# Patient Record
Sex: FEMALE | Race: WHITE | NOT HISPANIC OR LATINO
[De-identification: names, ages, dates, MRNs, and addresses within clinical notes are randomized per-mention and may not be internally consistent; named-entity substitution may affect disease eponyms.]

---

## 2018-01-19 ENCOUNTER — TRANSCRIPTION ENCOUNTER (OUTPATIENT)
Age: 83
End: 2018-01-19

## 2018-01-20 ENCOUNTER — EMERGENCY (EMERGENCY)
Facility: HOSPITAL | Age: 83
LOS: 1 days | Discharge: HOME | End: 2018-01-20
Attending: EMERGENCY MEDICINE | Admitting: EMERGENCY MEDICINE
Payer: MEDICARE

## 2018-01-20 VITALS
SYSTOLIC BLOOD PRESSURE: 134 MMHG | RESPIRATION RATE: 18 BRPM | WEIGHT: 149.91 LBS | OXYGEN SATURATION: 95 % | HEIGHT: 56 IN | TEMPERATURE: 98 F | HEART RATE: 71 BPM | DIASTOLIC BLOOD PRESSURE: 61 MMHG

## 2018-01-20 PROCEDURE — 70450 CT HEAD/BRAIN W/O DYE: CPT

## 2018-01-20 PROCEDURE — 72125 CT NECK SPINE W/O DYE: CPT

## 2018-01-20 PROCEDURE — 99284 EMERGENCY DEPT VISIT MOD MDM: CPT | Mod: 25

## 2018-01-20 PROCEDURE — 70450 CT HEAD/BRAIN W/O DYE: CPT | Mod: 26

## 2018-01-20 PROCEDURE — 99284 EMERGENCY DEPT VISIT MOD MDM: CPT

## 2018-01-20 PROCEDURE — 72125 CT NECK SPINE W/O DYE: CPT | Mod: 26

## 2018-01-20 RX ORDER — ACETAMINOPHEN 500 MG
650 TABLET ORAL ONCE
Qty: 0 | Refills: 0 | Status: COMPLETED | OUTPATIENT
Start: 2018-01-20 | End: 2018-01-20

## 2018-01-20 NOTE — ED ADULT TRIAGE NOTE - CHIEF COMPLAINT QUOTE
patient s/p fall going up stairs yesterday causing right hand and LLE wound. Went to Urgent Care yesterday and received wound care. Patient reports chronic LE edema and now with wound on LLE, wound is weeping. Patient reports today feels nauseas, out of it, dizzy, headache. Ecchymosis to left forehead.

## 2018-01-20 NOTE — ED PROVIDER NOTE - PROGRESS NOTE DETAILS
pt was noted to have a possible mass at the base of the tongue and will follow up with pmd and ent. family given a copy of the report

## 2018-01-20 NOTE — ED PROVIDER NOTE - OBJECTIVE STATEMENT
83 year old female with a h/o htn, hld presents with headache and dizziness. pt states that one day ago she fell while walking upstairs and hit her head against the bannister. she went to an urgent care and had her wounds addressed but since last night hse has had trouble concentrating. pt is not on any blood thinners

## 2018-01-20 NOTE — ED PROVIDER NOTE - CARE PLAN
Principal Discharge DX:	Post concussion syndrome  Secondary Diagnosis:	Hand abrasion, right, initial encounter  Secondary Diagnosis:	Skin avulsion

## 2018-01-20 NOTE — ED ADULT NURSE NOTE - OBJECTIVE STATEMENT
States yesterday fell while walking up the stairs, hit her head against the bannister, went to urgent care and dressed her wounds however today started having a headache and some dizziness.  PAtient denies blood thinners, denies LOC.

## 2018-11-12 ENCOUNTER — EMERGENCY (EMERGENCY)
Facility: HOSPITAL | Age: 83
LOS: 1 days | Discharge: HOME | End: 2018-11-12
Attending: EMERGENCY MEDICINE
Payer: MEDICARE

## 2018-11-12 VITALS
RESPIRATION RATE: 16 BRPM | SYSTOLIC BLOOD PRESSURE: 138 MMHG | OXYGEN SATURATION: 99 % | TEMPERATURE: 99 F | HEART RATE: 78 BPM | DIASTOLIC BLOOD PRESSURE: 75 MMHG

## 2018-11-12 VITALS
WEIGHT: 164.91 LBS | SYSTOLIC BLOOD PRESSURE: 136 MMHG | TEMPERATURE: 98 F | HEART RATE: 86 BPM | HEIGHT: 56 IN | OXYGEN SATURATION: 100 % | DIASTOLIC BLOOD PRESSURE: 69 MMHG | RESPIRATION RATE: 20 BRPM

## 2018-11-12 LAB
ALBUMIN SERPL ELPH-MCNC: 3.3 G/DL — SIGNIFICANT CHANGE UP (ref 3.3–5.2)
ALP SERPL-CCNC: 77 U/L — SIGNIFICANT CHANGE UP (ref 40–120)
ALT FLD-CCNC: 6 U/L — SIGNIFICANT CHANGE UP
ANION GAP SERPL CALC-SCNC: 13 MMOL/L — SIGNIFICANT CHANGE UP (ref 5–17)
APTT BLD: 32.5 SEC — SIGNIFICANT CHANGE UP (ref 27.5–36.3)
AST SERPL-CCNC: 16 U/L — SIGNIFICANT CHANGE UP
BASOPHILS # BLD AUTO: 0 K/UL — SIGNIFICANT CHANGE UP (ref 0–0.2)
BASOPHILS NFR BLD AUTO: 0.5 % — SIGNIFICANT CHANGE UP (ref 0–2)
BILIRUB SERPL-MCNC: 0.3 MG/DL — LOW (ref 0.4–2)
BUN SERPL-MCNC: 12 MG/DL — SIGNIFICANT CHANGE UP (ref 8–20)
CALCIUM SERPL-MCNC: 9.1 MG/DL — SIGNIFICANT CHANGE UP (ref 8.6–10.2)
CHLORIDE SERPL-SCNC: 95 MMOL/L — LOW (ref 98–107)
CO2 SERPL-SCNC: 24 MMOL/L — SIGNIFICANT CHANGE UP (ref 22–29)
CREAT SERPL-MCNC: 0.65 MG/DL — SIGNIFICANT CHANGE UP (ref 0.5–1.3)
EOSINOPHIL # BLD AUTO: 0.1 K/UL — SIGNIFICANT CHANGE UP (ref 0–0.5)
EOSINOPHIL NFR BLD AUTO: 1.2 % — SIGNIFICANT CHANGE UP (ref 0–6)
GLUCOSE SERPL-MCNC: 98 MG/DL — SIGNIFICANT CHANGE UP (ref 70–115)
HCT VFR BLD CALC: 32.4 % — LOW (ref 37–47)
HGB BLD-MCNC: 10.6 G/DL — LOW (ref 12–16)
INR BLD: 1.05 RATIO — SIGNIFICANT CHANGE UP (ref 0.88–1.16)
LYMPHOCYTES # BLD AUTO: 1.3 K/UL — SIGNIFICANT CHANGE UP (ref 1–4.8)
LYMPHOCYTES # BLD AUTO: 20.2 % — SIGNIFICANT CHANGE UP (ref 20–55)
MCHC RBC-ENTMCNC: 27.3 PG — SIGNIFICANT CHANGE UP (ref 27–31)
MCHC RBC-ENTMCNC: 32.7 G/DL — SIGNIFICANT CHANGE UP (ref 32–36)
MCV RBC AUTO: 83.5 FL — SIGNIFICANT CHANGE UP (ref 81–99)
MONOCYTES # BLD AUTO: 0.6 K/UL — SIGNIFICANT CHANGE UP (ref 0–0.8)
MONOCYTES NFR BLD AUTO: 9.2 % — SIGNIFICANT CHANGE UP (ref 3–10)
NEUTROPHILS # BLD AUTO: 4.6 K/UL — SIGNIFICANT CHANGE UP (ref 1.8–8)
NEUTROPHILS NFR BLD AUTO: 68.7 % — SIGNIFICANT CHANGE UP (ref 37–73)
NT-PROBNP SERPL-SCNC: 923 PG/ML — HIGH (ref 0–300)
PLATELET # BLD AUTO: 330 K/UL — SIGNIFICANT CHANGE UP (ref 150–400)
POTASSIUM SERPL-MCNC: 4.2 MMOL/L — SIGNIFICANT CHANGE UP (ref 3.5–5.3)
POTASSIUM SERPL-SCNC: 4.2 MMOL/L — SIGNIFICANT CHANGE UP (ref 3.5–5.3)
PROT SERPL-MCNC: 6.5 G/DL — LOW (ref 6.6–8.7)
PROTHROM AB SERPL-ACNC: 12.1 SEC — SIGNIFICANT CHANGE UP (ref 10–12.9)
RBC # BLD: 3.88 M/UL — LOW (ref 4.4–5.2)
RBC # FLD: 14.8 % — SIGNIFICANT CHANGE UP (ref 11–15.6)
SODIUM SERPL-SCNC: 132 MMOL/L — LOW (ref 135–145)
TROPONIN T SERPL-MCNC: <0.01 NG/ML — SIGNIFICANT CHANGE UP (ref 0–0.06)
WBC # BLD: 6.6 K/UL — SIGNIFICANT CHANGE UP (ref 4.8–10.8)
WBC # FLD AUTO: 6.6 K/UL — SIGNIFICANT CHANGE UP (ref 4.8–10.8)

## 2018-11-12 PROCEDURE — 73562 X-RAY EXAM OF KNEE 3: CPT

## 2018-11-12 PROCEDURE — 85027 COMPLETE CBC AUTOMATED: CPT

## 2018-11-12 PROCEDURE — 99285 EMERGENCY DEPT VISIT HI MDM: CPT

## 2018-11-12 PROCEDURE — 71045 X-RAY EXAM CHEST 1 VIEW: CPT | Mod: 26

## 2018-11-12 PROCEDURE — 85730 THROMBOPLASTIN TIME PARTIAL: CPT

## 2018-11-12 PROCEDURE — 76377 3D RENDER W/INTRP POSTPROCES: CPT

## 2018-11-12 PROCEDURE — 99284 EMERGENCY DEPT VISIT MOD MDM: CPT | Mod: 25

## 2018-11-12 PROCEDURE — 36415 COLL VENOUS BLD VENIPUNCTURE: CPT

## 2018-11-12 PROCEDURE — 83880 ASSAY OF NATRIURETIC PEPTIDE: CPT

## 2018-11-12 PROCEDURE — 93970 EXTREMITY STUDY: CPT | Mod: 26

## 2018-11-12 PROCEDURE — 93005 ELECTROCARDIOGRAM TRACING: CPT

## 2018-11-12 PROCEDURE — 71045 X-RAY EXAM CHEST 1 VIEW: CPT

## 2018-11-12 PROCEDURE — 93010 ELECTROCARDIOGRAM REPORT: CPT

## 2018-11-12 PROCEDURE — 76377 3D RENDER W/INTRP POSTPROCES: CPT | Mod: 26

## 2018-11-12 PROCEDURE — 73562 X-RAY EXAM OF KNEE 3: CPT | Mod: 26,RT

## 2018-11-12 PROCEDURE — 72192 CT PELVIS W/O DYE: CPT | Mod: 26

## 2018-11-12 PROCEDURE — 85610 PROTHROMBIN TIME: CPT

## 2018-11-12 PROCEDURE — 84484 ASSAY OF TROPONIN QUANT: CPT

## 2018-11-12 PROCEDURE — 93970 EXTREMITY STUDY: CPT

## 2018-11-12 PROCEDURE — 72192 CT PELVIS W/O DYE: CPT

## 2018-11-12 PROCEDURE — 80053 COMPREHEN METABOLIC PANEL: CPT

## 2018-11-12 RX ORDER — IBUPROFEN 200 MG
400 TABLET ORAL ONCE
Qty: 0 | Refills: 0 | Status: COMPLETED | OUTPATIENT
Start: 2018-11-12 | End: 2018-11-12

## 2018-11-12 RX ADMIN — Medication 400 MILLIGRAM(S): at 17:57

## 2018-11-12 RX ADMIN — Medication 400 MILLIGRAM(S): at 18:55

## 2018-11-12 NOTE — ED PROVIDER NOTE - PHYSICAL EXAMINATION
Const: Awake, alert and oriented. In no acute distress. Well appearing.  HEENT: NC/AT. Moist mucous membranes.  Eyes: No scleral icterus. EOMI.  Neck:. Soft and supple. Full ROM without pain.  Cardiac: Irregular rate and rhythm. +S1/S2. No murmurs. Peripheral pulses 2+ and symmetric. 4+ bilateral LE edema.  Resp: Speaking in full sentences. No evidence of respiratory distress. No wheezes, rales or rhonchi.  Abd: Soft, non-tender, non-distended. Normal bowel sounds in all 4 quadrants. No guarding or rebound.  Back: Spine midline and non-tender. No CVAT.  MSK: Right knee without focal edema, erythema or induration. Painless ROM to 30 degrees. Mild posterior popliteal TTP. Pelvis stable, + mild TTP of the right proximal femur. Patient unable to sit up due to severe pain in the right hip.  Skin: No rashes, abrasions or lacerations.  Neuro: Awake, alert & oriented x 3. Moves all extremities symmetrically.

## 2018-11-12 NOTE — ED PROVIDER NOTE - MEDICAL DECISION MAKING DETAILS
Patient presents complaining of right knee pain/ right hip pain which started yesterday. Unable to sit up of flex hip due to pain, right knee exam unremarkable. I suspect right knee pain coming from hip or pelvis. Will give ibuprofen for pain, CT pelvis to r/o occult fracture, XR knee and check EKG and labs.

## 2018-11-12 NOTE — ED PROVIDER NOTE - NS ED ROS FT
Const: Denies fever, chills  HEENT: Denies blurry vision, sore throat  Neck: Denies neck pain/stiffness  Resp: Denies coughing, SOB  Cardiovascular: + LE edema (chronic ). Denies CP, palpitations  GI: + constipation (chronic). Denies nausea, vomiting, abdominal pain, diarrhea, blood in stool  : Denies urinary frequency/urgency/dysuria, hematuria  MSK: + right knee pain, + right hip pain. Denies back pain  Neuro: Denies HA, dizziness, numbness, weakness  Skin: Denies rashes.

## 2018-11-12 NOTE — ED ADULT NURSE NOTE - NSIMPLEMENTINTERV_GEN_ALL_ED
Implemented All Universal Safety Interventions:  [place] to call system. Call bell, personal items and telephone within reach. Instruct patient to call for assistance. Room bathroom lighting operational. Non-slip footwear when patient is off stretcher. Physically safe environment: no spills, clutter or unnecessary equipment. Stretcher in lowest position, wheels locked, appropriate side rails in place.

## 2018-11-12 NOTE — ED PROVIDER NOTE - OBJECTIVE STATEMENT
Patient with PMH HTN, HLD, glaucoma, colitis presents complaining of right knee pain which started last night, which she states was atraumatic and severe. She did not take any medication for the pain. Pain is worse with movement and she has been unable to get into or out of bed or ambulate. She also notes a dull ache at the groin area. She denies back pain, CP, SOB, nausea, vomiting, abdominal pain, urinary symptoms, diarrhea, or numbness. She is chronically constipated. She does not take any blood thinning medications. She believes her bilateral LE are her baseline normal swelling for her.

## 2018-11-12 NOTE — ED ADULT NURSE NOTE - OBJECTIVE STATEMENT
pt has had right knee pain for 3 weeks but it got worse 9/10 yesterday, and she stated she is unable to function she denied chest pain and sob. she denied any injury to it

## 2018-11-12 NOTE — ED PROVIDER NOTE - PROGRESS NOTE DETAILS
Patient reassessed. She notes improvement of pain at this time. Still pending official read of CT and results of labs. pain controlled ambulating gn nad f/u dr velazquez

## 2019-06-02 ENCOUNTER — INPATIENT (INPATIENT)
Facility: HOSPITAL | Age: 84
LOS: 1 days | Discharge: HOME | DRG: 916 | End: 2019-06-04
Attending: INTERNAL MEDICINE | Admitting: INTERNAL MEDICINE
Payer: MEDICARE

## 2019-06-02 VITALS
HEART RATE: 110 BPM | OXYGEN SATURATION: 100 % | DIASTOLIC BLOOD PRESSURE: 75 MMHG | SYSTOLIC BLOOD PRESSURE: 124 MMHG | RESPIRATION RATE: 20 BRPM | TEMPERATURE: 98 F | HEIGHT: 60 IN | WEIGHT: 160.06 LBS

## 2019-06-02 DIAGNOSIS — T78.3XXA ANGIONEUROTIC EDEMA, INITIAL ENCOUNTER: ICD-10-CM

## 2019-06-02 DIAGNOSIS — J98.8 OTHER SPECIFIED RESPIRATORY DISORDERS: ICD-10-CM

## 2019-06-02 LAB
ALBUMIN SERPL ELPH-MCNC: 3.7 G/DL — SIGNIFICANT CHANGE UP (ref 3.3–5.2)
ALP SERPL-CCNC: 110 U/L — SIGNIFICANT CHANGE UP (ref 40–120)
ALT FLD-CCNC: 10 U/L — SIGNIFICANT CHANGE UP
ANION GAP SERPL CALC-SCNC: 13 MMOL/L — SIGNIFICANT CHANGE UP (ref 5–17)
AST SERPL-CCNC: 20 U/L — SIGNIFICANT CHANGE UP
BILIRUB SERPL-MCNC: 0.3 MG/DL — LOW (ref 0.4–2)
BUN SERPL-MCNC: 19 MG/DL — SIGNIFICANT CHANGE UP (ref 8–20)
CALCIUM SERPL-MCNC: 9.6 MG/DL — SIGNIFICANT CHANGE UP (ref 8.6–10.2)
CHLORIDE SERPL-SCNC: 101 MMOL/L — SIGNIFICANT CHANGE UP (ref 98–107)
CO2 SERPL-SCNC: 26 MMOL/L — SIGNIFICANT CHANGE UP (ref 22–29)
CREAT SERPL-MCNC: 0.97 MG/DL — SIGNIFICANT CHANGE UP (ref 0.5–1.3)
ERYTHROCYTE [SEDIMENTATION RATE] IN BLOOD: 52 MM/HR — HIGH (ref 0–20)
GLUCOSE SERPL-MCNC: 95 MG/DL — SIGNIFICANT CHANGE UP (ref 70–115)
HCT VFR BLD CALC: 33.4 % — LOW (ref 37–47)
HGB BLD-MCNC: 10.9 G/DL — LOW (ref 12–16)
LIDOCAIN IGE QN: 54 U/L — HIGH (ref 22–51)
MCHC RBC-ENTMCNC: 27.3 PG — SIGNIFICANT CHANGE UP (ref 27–31)
MCHC RBC-ENTMCNC: 32.6 G/DL — SIGNIFICANT CHANGE UP (ref 32–36)
MCV RBC AUTO: 83.7 FL — SIGNIFICANT CHANGE UP (ref 81–99)
PLATELET # BLD AUTO: 364 K/UL — SIGNIFICANT CHANGE UP (ref 150–400)
POTASSIUM SERPL-MCNC: 4.1 MMOL/L — SIGNIFICANT CHANGE UP (ref 3.5–5.3)
POTASSIUM SERPL-SCNC: 4.1 MMOL/L — SIGNIFICANT CHANGE UP (ref 3.5–5.3)
PROT SERPL-MCNC: 7.7 G/DL — SIGNIFICANT CHANGE UP (ref 6.6–8.7)
RBC # BLD: 3.99 M/UL — LOW (ref 4.4–5.2)
RBC # FLD: 14.8 % — SIGNIFICANT CHANGE UP (ref 11–15.6)
SODIUM SERPL-SCNC: 140 MMOL/L — SIGNIFICANT CHANGE UP (ref 135–145)
TROPONIN T SERPL-MCNC: <0.01 NG/ML — SIGNIFICANT CHANGE UP (ref 0–0.06)
WBC # BLD: 7.1 K/UL — SIGNIFICANT CHANGE UP (ref 4.8–10.8)
WBC # FLD AUTO: 7.1 K/UL — SIGNIFICANT CHANGE UP (ref 4.8–10.8)

## 2019-06-02 PROCEDURE — 71045 X-RAY EXAM CHEST 1 VIEW: CPT | Mod: 26

## 2019-06-02 PROCEDURE — 99291 CRITICAL CARE FIRST HOUR: CPT

## 2019-06-02 PROCEDURE — 93010 ELECTROCARDIOGRAM REPORT: CPT

## 2019-06-02 RX ORDER — DEXAMETHASONE 0.5 MG/5ML
6 ELIXIR ORAL EVERY 6 HOURS
Refills: 0 | Status: DISCONTINUED | OUTPATIENT
Start: 2019-06-02 | End: 2019-06-04

## 2019-06-02 RX ORDER — FUROSEMIDE 40 MG
1 TABLET ORAL
Qty: 0 | Refills: 0 | DISCHARGE

## 2019-06-02 RX ORDER — DIPHENHYDRAMINE HCL 50 MG
25 CAPSULE ORAL ONCE
Refills: 0 | Status: COMPLETED | OUTPATIENT
Start: 2019-06-02 | End: 2019-06-02

## 2019-06-02 RX ORDER — ENOXAPARIN SODIUM 100 MG/ML
40 INJECTION SUBCUTANEOUS DAILY
Refills: 0 | Status: DISCONTINUED | OUTPATIENT
Start: 2019-06-02 | End: 2019-06-04

## 2019-06-02 RX ORDER — FAMOTIDINE 10 MG/ML
20 INJECTION INTRAVENOUS ONCE
Refills: 0 | Status: COMPLETED | OUTPATIENT
Start: 2019-06-02 | End: 2019-06-02

## 2019-06-02 RX ORDER — DEXAMETHASONE 0.5 MG/5ML
10 ELIXIR ORAL ONCE
Refills: 0 | Status: COMPLETED | OUTPATIENT
Start: 2019-06-02 | End: 2019-06-02

## 2019-06-02 RX ORDER — DIPHENHYDRAMINE HCL 50 MG
50 CAPSULE ORAL EVERY 8 HOURS
Refills: 0 | Status: DISCONTINUED | OUTPATIENT
Start: 2019-06-02 | End: 2019-06-04

## 2019-06-02 RX ORDER — LATANOPROST 0.05 MG/ML
1 SOLUTION/ DROPS OPHTHALMIC; TOPICAL AT BEDTIME
Refills: 0 | Status: DISCONTINUED | OUTPATIENT
Start: 2019-06-02 | End: 2019-06-04

## 2019-06-02 RX ORDER — TIMOLOL 0.5 %
1 DROPS OPHTHALMIC (EYE) DAILY
Refills: 0 | Status: DISCONTINUED | OUTPATIENT
Start: 2019-06-02 | End: 2019-06-04

## 2019-06-02 RX ORDER — CHLORHEXIDINE GLUCONATE 213 G/1000ML
1 SOLUTION TOPICAL DAILY
Refills: 0 | Status: DISCONTINUED | OUTPATIENT
Start: 2019-06-02 | End: 2019-06-04

## 2019-06-02 RX ORDER — FAMOTIDINE 10 MG/ML
20 INJECTION INTRAVENOUS EVERY 12 HOURS
Refills: 0 | Status: DISCONTINUED | OUTPATIENT
Start: 2019-06-02 | End: 2019-06-04

## 2019-06-02 RX ADMIN — ENOXAPARIN SODIUM 40 MILLIGRAM(S): 100 INJECTION SUBCUTANEOUS at 17:45

## 2019-06-02 RX ADMIN — FAMOTIDINE 20 MILLIGRAM(S): 10 INJECTION INTRAVENOUS at 17:45

## 2019-06-02 RX ADMIN — Medication 101 MILLIGRAM(S): at 04:15

## 2019-06-02 RX ADMIN — CHLORHEXIDINE GLUCONATE 1 APPLICATION(S): 213 SOLUTION TOPICAL at 17:21

## 2019-06-02 RX ADMIN — Medication 6 MILLIGRAM(S): at 17:44

## 2019-06-02 RX ADMIN — Medication 6 MILLIGRAM(S): at 11:28

## 2019-06-02 RX ADMIN — LATANOPROST 1 DROP(S): 0.05 SOLUTION/ DROPS OPHTHALMIC; TOPICAL at 21:32

## 2019-06-02 RX ADMIN — Medication 6 MILLIGRAM(S): at 23:35

## 2019-06-02 RX ADMIN — Medication 50 MILLIGRAM(S): at 14:19

## 2019-06-02 RX ADMIN — Medication 10 MILLIGRAM(S): at 04:00

## 2019-06-02 RX ADMIN — Medication 50 MILLIGRAM(S): at 21:32

## 2019-06-02 RX ADMIN — FAMOTIDINE 20 MILLIGRAM(S): 10 INJECTION INTRAVENOUS at 04:00

## 2019-06-02 NOTE — H&P ADULT - ASSESSMENT
Pt is an 84 YOF with angioedema and airway/tongue edema admitted to ICU for concern of compromised airway and potential to require intubation.

## 2019-06-02 NOTE — ED ADULT NURSE NOTE - CHIEF COMPLAINT QUOTE
Pt A&Ox4 states "My tongue is swollen and its getting hard to swallow."  BIBA from home c/o feeling SOB and difficulty swallowing with tongue swelling arms began to get red and itchy in ambulance. Patient awake alert and cooperative, tongue swollen with difficulty swallowing, muffled speech, MAEx4, does not know what could be having allergic reaction to. MD ROA at bedside code team called.

## 2019-06-02 NOTE — ED ADULT NURSE NOTE - OBJECTIVE STATEMENT
Pt presented to Ed stating she went to bed today feeling itchy. Woke and felt worse, with tongue swelling and called 911. Code team called to bedside. Dr Singh to bedside. Iv placed labs sent. SP02 98% on room air. Lungs clear bilaterally, respirations even non labored. speech muffled, but able to speak. Pt denies any new medications or allergies to food. Pt presented to Ed stating she went to bed today feeling itchy. Woke and felt worse, with tongue swelling and called 911. Code team called to bedside. Dr Singh to bedside. Iv placed labs sent. SP02 98% on room air. Lungs clear bilaterally, respirations even non labored. No s/s of respiratory distress noted. speech muffled, but able to speak. Pt denies any new medications or allergies to food. Iv placed labs sent medications given per order.

## 2019-06-02 NOTE — ED PROVIDER NOTE - PROGRESS NOTE DETAILS
Called ICU for consultation, however team is upstairs intubating another patient, awaiting call back. Patient with continued tongue swelling ( unchanged) but tolerating oral secretions and speaking clearly. ICU consultation obtained. Will admit. Patient resting comfortably.

## 2019-06-02 NOTE — H&P ADULT - ATTENDING COMMENTS
Pt admitted by [facility], I have seen and evaluated this patient independently and agree with the above findings except as follows:  84 YOF with angioedema and airway/tongue edema admitted to ICU for concern of compromised airway possibly allergic reaction from bumex vs angioedema from ACEi? Pt has a patent airway and tongue edema has regressed somewhat, she continues to have a large fluid filled bullae on right lateral tongue surface and c/o difficulty swallowing, air is passing freely through with no auscultated extraneous noise on neck, there is some submandibular fullness will continue to monitor airway, continue decadron, H2 blockers.

## 2019-06-02 NOTE — H&P ADULT - ENMT COMMENTS
tongue edema, difficulty to visualize posterior pharynx, managing secretions at this time, no drooling

## 2019-06-02 NOTE — ED ADULT NURSE NOTE - CHPI ED NUR SYMPTOMS NEG
no congestion/no nausea/no wheezing/no difficulty breathing/no rash/no vomiting/no shortness of breath

## 2019-06-02 NOTE — PATIENT PROFILE ADULT - NSPROEXTENSIONSOFSELF_GEN_A_NUR
Pt given discharge papers and work note. Pt did not have any questions and ambulated out of dept.    eyeglasses

## 2019-06-02 NOTE — ED ADULT NURSE REASSESSMENT NOTE - NS ED NURSE REASSESS COMMENT FT1
Pt resting comfortably in bed. Respirations even non labored. No s/s of respiratory distress noted. SP02 97% on Room air. Pt denies difficulty breathing, stated she feels slightly better. Pt admitted to MICU. AWaiting report to RN in MICU. safety maintained.

## 2019-06-02 NOTE — H&P ADULT - NSHPSOCIALHISTORY_GEN_ALL_CORE
Lives alone but has daughter who helps her with shopping, etc.  Never smoked, no ETOH, no drug use,  uses walker to walk but has some difficulty

## 2019-06-02 NOTE — ED PROVIDER NOTE - CARDIAC, MLM
Tachycardic rate, regular rhythm.  Heart sounds S1, S2.  No murmurs, rubs or gallops. Swollen LE (but has chronic lymphedema, unchanged)

## 2019-06-02 NOTE — SWALLOW BEDSIDE ASSESSMENT ADULT - SWALLOW EVAL: RECOMMENDED FEEDING/EATING TECHNIQUES
maintain upright posture during/after eating for 30 mins/oral hygiene/allow for swallow between intakes/small sips/bites

## 2019-06-02 NOTE — ED ADULT NURSE NOTE - NSIMPLEMENTINTERV_GEN_ALL_ED
Implemented All Universal Safety Interventions:  [place] to call system. Call bell, personal items and telephone within reach. Instruct patient to call for assistance. Room bathroom lighting operational. Non-slip footwear when patient is off stretcher. Physically safe environment: no spills, clutter or unnecessary equipment. Stretcher in lowest position, wheels locked, appropriate side rails in place.

## 2019-06-02 NOTE — H&P ADULT - PROBLEM SELECTOR PLAN 1
Decadron IV  Benadryl  Pepcid  Unclear if angioedema from ACE-I vs new medication allergy to Bumex  If airway edema worsens may need intubation/airway protection  Hold both bumex and ACE_I  May require different antihypertensives

## 2019-06-02 NOTE — ED PROVIDER NOTE - OBJECTIVE STATEMENT
83 y/o F with PMHx of colitis, glaucoma, HTN and HLD presents to ED c/o sudden onset tongue swelling, difficulty breathing and difficulty swallowing tonight. States throughout the day, she has generalized itchiness diffusely across her body, but was able to fall asleep until she woke up suddenly with these symptoms and activated EMS. Denies fevers, chills, cough, congestion, chest pain, abdominal pain, n/v/d, ingestion of new foods, new environmental exposures or known bug bites. No further acute complaints at this time.

## 2019-06-02 NOTE — H&P ADULT - HISTORY OF PRESENT ILLNESS
Pt is an 84 YOF h/o HTN, HLD, Colitis in distant past, Glaucoma, presented  to ED last night overnight with  c/o sudden onset tongue swelling, difficulty breathing and difficulty swallowing tonight. States throughout the day, she has generalized itchiness diffusely across her body, but was able to fall asleep until she woke up suddenly with these symptoms and activated EMS. Denies fevers, chills, cough, congestion, chest pain, abdominal pain, n/v/d, ingestion of new foods, new environmental exposures or known bug bites.  Pt reports she was recently started on bumex for lymphedema and has been on enalapril for years.  Denies other complaints.  PT was admitted to ICU due to concern for airway protection and is receiving steroids, benadryl and pepcid.

## 2019-06-03 LAB
ANION GAP SERPL CALC-SCNC: 11 MMOL/L — SIGNIFICANT CHANGE UP (ref 5–17)
BUN SERPL-MCNC: 25 MG/DL — HIGH (ref 8–20)
C3 SERPL-MCNC: 118 MG/DL — SIGNIFICANT CHANGE UP (ref 81–157)
C4 SERPL-MCNC: 23 MG/DL — SIGNIFICANT CHANGE UP (ref 13–39)
CALCIUM SERPL-MCNC: 8.7 MG/DL — SIGNIFICANT CHANGE UP (ref 8.6–10.2)
CHLORIDE SERPL-SCNC: 105 MMOL/L — SIGNIFICANT CHANGE UP (ref 98–107)
CO2 SERPL-SCNC: 23 MMOL/L — SIGNIFICANT CHANGE UP (ref 22–29)
CREAT SERPL-MCNC: 0.9 MG/DL — SIGNIFICANT CHANGE UP (ref 0.5–1.3)
GLUCOSE SERPL-MCNC: 126 MG/DL — HIGH (ref 70–115)
HCT VFR BLD CALC: 29.5 % — LOW (ref 37–47)
HGB BLD-MCNC: 9.6 G/DL — LOW (ref 12–16)
MAGNESIUM SERPL-MCNC: 2.1 MG/DL — SIGNIFICANT CHANGE UP (ref 1.6–2.6)
MCHC RBC-ENTMCNC: 27.1 PG — SIGNIFICANT CHANGE UP (ref 27–31)
MCHC RBC-ENTMCNC: 32.5 G/DL — SIGNIFICANT CHANGE UP (ref 32–36)
MCV RBC AUTO: 83.3 FL — SIGNIFICANT CHANGE UP (ref 81–99)
PHOSPHATE SERPL-MCNC: 3.8 MG/DL — SIGNIFICANT CHANGE UP (ref 2.4–4.7)
PLATELET # BLD AUTO: 310 K/UL — SIGNIFICANT CHANGE UP (ref 150–400)
POTASSIUM SERPL-MCNC: 4.4 MMOL/L — SIGNIFICANT CHANGE UP (ref 3.5–5.3)
POTASSIUM SERPL-SCNC: 4.4 MMOL/L — SIGNIFICANT CHANGE UP (ref 3.5–5.3)
RBC # BLD: 3.54 M/UL — LOW (ref 4.4–5.2)
RBC # FLD: 14.8 % — SIGNIFICANT CHANGE UP (ref 11–15.6)
SODIUM SERPL-SCNC: 139 MMOL/L — SIGNIFICANT CHANGE UP (ref 135–145)
WBC # BLD: 4 K/UL — LOW (ref 4.8–10.8)
WBC # FLD AUTO: 4 K/UL — LOW (ref 4.8–10.8)

## 2019-06-03 PROCEDURE — 99223 1ST HOSP IP/OBS HIGH 75: CPT

## 2019-06-03 PROCEDURE — 12345: CPT | Mod: NC

## 2019-06-03 RX ORDER — BUMETANIDE 0.25 MG/ML
2 INJECTION INTRAMUSCULAR; INTRAVENOUS DAILY
Refills: 0 | Status: DISCONTINUED | OUTPATIENT
Start: 2019-06-03 | End: 2019-06-04

## 2019-06-03 RX ADMIN — Medication 6 MILLIGRAM(S): at 06:14

## 2019-06-03 RX ADMIN — Medication 6 MILLIGRAM(S): at 18:45

## 2019-06-03 RX ADMIN — Medication 50 MILLIGRAM(S): at 13:33

## 2019-06-03 RX ADMIN — Medication 6 MILLIGRAM(S): at 13:33

## 2019-06-03 RX ADMIN — Medication 50 MILLIGRAM(S): at 06:14

## 2019-06-03 RX ADMIN — LATANOPROST 1 DROP(S): 0.05 SOLUTION/ DROPS OPHTHALMIC; TOPICAL at 22:25

## 2019-06-03 RX ADMIN — Medication 1 DROP(S): at 06:14

## 2019-06-03 RX ADMIN — CHLORHEXIDINE GLUCONATE 1 APPLICATION(S): 213 SOLUTION TOPICAL at 13:29

## 2019-06-03 RX ADMIN — FAMOTIDINE 20 MILLIGRAM(S): 10 INJECTION INTRAVENOUS at 06:15

## 2019-06-03 NOTE — CHART NOTE - NSCHARTNOTEFT_GEN_A_CORE
HPI/HOSPITAL COURSE: Pt is an 83 YO Female with a  pmh/o HTN on ACE, HLD, chronic lymphedema for which she has ZOILA boots and was recently started on bumex, Colitis, Glaucoma, who originally  presented  to ED and was admitted to ICU due to angioedema likely from ACEI vs bumex allergy. Pt experienced sudden onset tongue swelling, difficulty breathing and difficulty swallowing and required IV steroids/H2 blocker/benadryl. Denies fevers, chills, cough, congestion, chest pain, abdominal pain, n/v/d, ingestion of new foods, new environmental exposures or known bug bites.  No new complaints, states her urticaria has improved as has her breathing and swelling. Pt downgraded with plan for d/c off ACE-I.     ROS: neg except as per HPI    Allergies:  Benzalkonium Chloride (Unknown)  lactose (Other)  Intolerances    PAST MEDICAL & SURGICAL HISTORY:  Colitis  Hypertension  Hyperlipemia  Glaucoma  Cataract of left eye    FAMILY HISTORY: non contributory, no first degree history of ACE allergy    Home Medications:  bumetanide 2 mg oral tablet: 1 tab(s) orally once a day (02 Jun 2019 21:08)  enalapril 10 mg oral tablet: 2 tab(s) orally once a day (02 Jun 2019 21:08)  Timoptic 0.5% ophthalmic solution: 1 dose(s) to right eye (02 Jun 2019 21:08)  Travatan Z 0.004% ophthalmic solution: 1 drop(s) to each affected eye once a day (in the evening) (02 Jun 2019 21:08)    MEDICATIONS  (STANDING):  chlorhexidine 2% Cloths 1 Application(s) Topical daily  dexamethasone  Injectable 6 milliGRAM(s) IV Push every 6 hours  diphenhydrAMINE   Injectable 50 milliGRAM(s) IV Push every 8 hours  enoxaparin Injectable 40 milliGRAM(s) SubCutaneous daily  famotidine Injectable 20 milliGRAM(s) IV Push every 12 hours  latanoprost 0.005% Ophthalmic Solution 1 Drop(s) Right EYE at bedtime  timolol 0.5% Solution 1 Drop(s) Right EYE daily    SOCIAL HISTORY: no drug/etoh/smoking, uses walker, lives alone    VITAL SIGNS:  T(C): 36.7 (02 Jun 2019 23:00), Max: 36.9 (02 Jun 2019 20:59)  T(F): 98 (02 Jun 2019 23:00), Max: 98.4 (02 Jun 2019 20:59)  HR: 75 (02 Jun 2019 23:00) (70 - 116)  BP: 120/62 (02 Jun 2019 23:00) (118/56 - 149/86)  BP(mean): 79 (02 Jun 2019 21:00) (79 - 110)  RR: 18 (02 Jun 2019 23:00) (13 - 29)  SpO2: 97% (02 Jun 2019 23:00) (92% - 100%)    PHYSICAL EXAM:  Constitutional: NAD  Eyes: EOMI  Respiratory: CTABL  Cardiovascular: RRR, NSR  Gastrointestinal: NTND, +BS  Extremities: b/l zoila boots in place, warm, palpable pulses  Neurological: AAOx3, no focal neurological deficits appreciated on exam  Musculoskeletal: +5MSK throughout  Psychiatric: AAOx3    Assessment and Plan: Pt is an 85 yo female admitted to ICU likely secondary to ACE-I allergy    1) Angioedema- acute, concerning for ACE-I allergy   cont benadryl, decadron, pepcid  passed swallow eval-advance diet as tolerated  hold ACEI and bumex-give antiHTN if needed    2) Chronic lymphedema  hold bumex  monitor renal function and fluid status-may need diuresis  f/u with vascular as outpt for ZOILA boots    3) Gait instability  consider PT eval prior to d/c  ambulates with walker at home    4) Need for prophylactic measure

## 2019-06-04 ENCOUNTER — TRANSCRIPTION ENCOUNTER (OUTPATIENT)
Age: 84
End: 2019-06-04

## 2019-06-04 VITALS
RESPIRATION RATE: 18 BRPM | TEMPERATURE: 98 F | OXYGEN SATURATION: 96 % | DIASTOLIC BLOOD PRESSURE: 75 MMHG | SYSTOLIC BLOOD PRESSURE: 125 MMHG | HEART RATE: 62 BPM

## 2019-06-04 LAB
HCT VFR BLD CALC: 30.7 % — LOW (ref 37–47)
HGB BLD-MCNC: 9.9 G/DL — LOW (ref 12–16)
MCHC RBC-ENTMCNC: 27.1 PG — SIGNIFICANT CHANGE UP (ref 27–31)
MCHC RBC-ENTMCNC: 32.2 G/DL — SIGNIFICANT CHANGE UP (ref 32–36)
MCV RBC AUTO: 84.1 FL — SIGNIFICANT CHANGE UP (ref 81–99)
PLATELET # BLD AUTO: 318 K/UL — SIGNIFICANT CHANGE UP (ref 150–400)
RBC # BLD: 3.65 M/UL — LOW (ref 4.4–5.2)
RBC # FLD: 15.1 % — SIGNIFICANT CHANGE UP (ref 11–15.6)
WBC # BLD: 7.5 K/UL — SIGNIFICANT CHANGE UP (ref 4.8–10.8)
WBC # FLD AUTO: 7.5 K/UL — SIGNIFICANT CHANGE UP (ref 4.8–10.8)

## 2019-06-04 PROCEDURE — 86160 COMPLEMENT ANTIGEN: CPT

## 2019-06-04 PROCEDURE — 83690 ASSAY OF LIPASE: CPT

## 2019-06-04 PROCEDURE — 85652 RBC SED RATE AUTOMATED: CPT

## 2019-06-04 PROCEDURE — 97163 PT EVAL HIGH COMPLEX 45 MIN: CPT

## 2019-06-04 PROCEDURE — 80053 COMPREHEN METABOLIC PANEL: CPT

## 2019-06-04 PROCEDURE — 80048 BASIC METABOLIC PNL TOTAL CA: CPT

## 2019-06-04 PROCEDURE — 85027 COMPLETE CBC AUTOMATED: CPT

## 2019-06-04 PROCEDURE — 86161 COMPLEMENT/FUNCTION ACTIVITY: CPT

## 2019-06-04 PROCEDURE — 84484 ASSAY OF TROPONIN QUANT: CPT

## 2019-06-04 PROCEDURE — 83735 ASSAY OF MAGNESIUM: CPT

## 2019-06-04 PROCEDURE — 96375 TX/PRO/DX INJ NEW DRUG ADDON: CPT

## 2019-06-04 PROCEDURE — 99285 EMERGENCY DEPT VISIT HI MDM: CPT | Mod: 25

## 2019-06-04 PROCEDURE — 84100 ASSAY OF PHOSPHORUS: CPT

## 2019-06-04 PROCEDURE — 36415 COLL VENOUS BLD VENIPUNCTURE: CPT

## 2019-06-04 PROCEDURE — 71045 X-RAY EXAM CHEST 1 VIEW: CPT

## 2019-06-04 PROCEDURE — 99239 HOSP IP/OBS DSCHRG MGMT >30: CPT

## 2019-06-04 PROCEDURE — 96374 THER/PROPH/DIAG INJ IV PUSH: CPT

## 2019-06-04 PROCEDURE — 93005 ELECTROCARDIOGRAM TRACING: CPT

## 2019-06-04 RX ORDER — EPINEPHRINE 0.3 MG/.3ML
0.3 INJECTION INTRAMUSCULAR; SUBCUTANEOUS
Qty: 0.6 | Refills: 0
Start: 2019-06-04 | End: 2019-07-03

## 2019-06-04 RX ORDER — BUMETANIDE 0.25 MG/ML
1 INJECTION INTRAMUSCULAR; INTRAVENOUS
Qty: 0 | Refills: 0 | DISCHARGE
Start: 2019-06-04

## 2019-06-04 RX ORDER — BUMETANIDE 0.25 MG/ML
1 INJECTION INTRAMUSCULAR; INTRAVENOUS
Qty: 0 | Refills: 0 | DISCHARGE

## 2019-06-04 RX ADMIN — Medication 1 DROP(S): at 06:49

## 2019-06-04 RX ADMIN — FAMOTIDINE 20 MILLIGRAM(S): 10 INJECTION INTRAVENOUS at 17:55

## 2019-06-04 RX ADMIN — FAMOTIDINE 20 MILLIGRAM(S): 10 INJECTION INTRAVENOUS at 00:02

## 2019-06-04 RX ADMIN — Medication 50 MILLIGRAM(S): at 13:59

## 2019-06-04 RX ADMIN — Medication 50 MILLIGRAM(S): at 00:02

## 2019-06-04 RX ADMIN — Medication 6 MILLIGRAM(S): at 00:02

## 2019-06-04 RX ADMIN — Medication 50 MILLIGRAM(S): at 06:49

## 2019-06-04 RX ADMIN — Medication 6 MILLIGRAM(S): at 11:24

## 2019-06-04 RX ADMIN — ENOXAPARIN SODIUM 40 MILLIGRAM(S): 100 INJECTION SUBCUTANEOUS at 00:03

## 2019-06-04 RX ADMIN — FAMOTIDINE 20 MILLIGRAM(S): 10 INJECTION INTRAVENOUS at 06:49

## 2019-06-04 RX ADMIN — Medication 6 MILLIGRAM(S): at 17:55

## 2019-06-04 RX ADMIN — Medication 6 MILLIGRAM(S): at 06:49

## 2019-06-04 NOTE — PHYSICAL THERAPY INITIAL EVALUATION ADULT - ADDITIONAL COMMENTS
per patient she does not have steps to enter. She ambulates with a RW and has PT and an aide come over.

## 2019-06-04 NOTE — DISCHARGE NOTE PROVIDER - CARE PROVIDER_API CALL
Mahendra Sanchez)  Internal Medicine  [address] [place] [address]  Phone: [phone]  Fax: [phone]  Follow Up Time: 1 week

## 2019-06-04 NOTE — PHYSICAL THERAPY INITIAL EVALUATION ADULT - PLANNED THERAPY INTERVENTIONS, PT EVAL
strengthening/neuromuscular re-education/stretching/balance training/ROM/bed mobility training/transfer training

## 2019-06-04 NOTE — DISCHARGE NOTE PROVIDER - NSDCCPCAREPLAN_GEN_ALL_CORE_FT
PRINCIPAL DISCHARGE DIAGNOSIS  Diagnosis: Angioedema, initial encounter  Assessment and Plan of Treatment: You were treated for this condition and at this time it is considered resolved. You may follow up with your doctors as an outpatient. DO NOT TAKE ANY MEDICATION IN THE ACE INHIBITOR FAMILY AT ALL.      SECONDARY DISCHARGE DIAGNOSES  Diagnosis: Chronic acquired lymphedema  Assessment and Plan of Treatment: follow up as recommended with a vascular surgeon to care for your legs

## 2019-06-04 NOTE — DISCHARGE NOTE PROVIDER - NSDCFUADDAPPT_GEN_ALL_CORE_FT
-Follow up with Primary Care Doctor within 1 week. Be sure to bring a copy of your entire discharge documentation with you to your visit so that they can review what occurred during your hospitalization and make a copy for their records.

## 2019-06-04 NOTE — PHYSICAL THERAPY INITIAL EVALUATION ADULT - PERTINENT HX OF CURRENT PROBLEM, REHAB EVAL
Pt presents to [facility] with reports of worsening tongue swelling. pt intubated to preserve airway.

## 2019-06-04 NOTE — DISCHARGE NOTE NURSING/CASE MANAGEMENT/SOCIAL WORK - NSDCDPATPORTLINK_GEN_ALL_CORE
You can access the [facility] Patient Portal, offered by [facility], by registering with the following website: [email or web address]

## 2019-06-05 LAB
C1INH FUNCTIONAL FLD-MCNC: >90 — SIGNIFICANT CHANGE UP
C1INH FUNCTIONAL/C1INH TOTAL MFR SERPL: 29 MG/DL — SIGNIFICANT CHANGE UP (ref 21–39)

## 2019-06-08 ENCOUNTER — EMERGENCY (EMERGENCY)
Facility: HOSPITAL | Age: 84
LOS: 1 days | Discharge: HOME | End: 2019-06-08
Attending: EMERGENCY MEDICINE
Payer: MEDICARE

## 2019-06-08 VITALS
TEMPERATURE: 98 F | RESPIRATION RATE: 16 BRPM | HEIGHT: 56 IN | DIASTOLIC BLOOD PRESSURE: 68 MMHG | WEIGHT: 147.05 LBS | SYSTOLIC BLOOD PRESSURE: 124 MMHG | OXYGEN SATURATION: 96 % | HEART RATE: 107 BPM

## 2019-06-08 PROCEDURE — 99283 EMERGENCY DEPT VISIT LOW MDM: CPT

## 2019-06-08 RX ORDER — DIPHENHYDRAMINE HCL 50 MG
25 CAPSULE ORAL ONCE
Refills: 0 | Status: COMPLETED | OUTPATIENT
Start: 2019-06-08 | End: 2019-06-08

## 2019-06-08 RX ADMIN — Medication 40 MILLIGRAM(S): at 12:23

## 2019-06-08 RX ADMIN — Medication 25 MILLIGRAM(S): at 12:23

## 2019-06-08 NOTE — ED STATDOCS - ATTENDING CONTRIBUTION TO CARE
I, Cyndee Mao, performed the initial face to face bedside interview with this patient regarding history of present illness, review of symptoms and relevant past medical, social and family history.  I completed an independent physical examination.  I was the initial provider who evaluated this patient. I have signed out the follow up of any pending tests (i.e. labs, radiological studies) to the ACP.  I have communicated the patient’s plan of care and disposition with the ACP.

## 2019-06-08 NOTE — ED STATDOCS - OBJECTIVE STATEMENT
83 y/o F w/ PMHx of edema, HTN, HLD and glaucoma presents to ED c/o hives first noticed this morning around 0730. Associated itchiness. Pt states she was seen in [facility] last week for same however she also had tongue swelling within 10-12 hours of noticing the hives. She denies intubation during her previous visit. Pt reports the adverse rxn was caused by ACE inhibitors in combination to her new Rx water pill, she has since d/c water pill. Pt reports her tongue feels heavy or "funny" but denies swelling. Denies f/c/n/v/cp/sob/palpitations/ cough/headache/dizziness/abd.pain/d/c/dysuria/hematuria/recent travel

## 2019-06-08 NOTE — ED ADULT TRIAGE NOTE - CHIEF COMPLAINT QUOTE
Pt was here on Wednesday for allergic reaction to ace inhibitor. Pt states that she now woke up this morning with hives to arms, legs, and abdomen. Nothing on her face, denies any SOB or tongue swelling.

## 2019-06-08 NOTE — ED STATDOCS - CLINICAL SUMMARY MEDICAL DECISION MAKING FREE TEXT BOX
Allergic rxn, no airway involvement will give benadryl and steroids given recent hospitalization for similar. will observe, if getting better and no airway involvement will D/C home. Allergic rxn, no airway involvement, will give benadryl and steroids given recent hospitalization for similar. will observe, if getting better and no airway involvement will D/C home.

## 2019-06-25 LAB — COMPLEMENT FACTOR I RESULT: 51 — SIGNIFICANT CHANGE UP

## 2019-08-13 NOTE — ED ADULT TRIAGE NOTE - CHIEF COMPLAINT QUOTE
Future Appointments   Date Time Provider Jeannine Rubio   9/9/2019  2:20 PM Rik Colbert MD [facility]     Pt would like fasting BW orders sent [facility]. Pt A&Ox4 states "My tongue is swollen and its getting hard to swallow."  BIBA from home c/o feeling SOB and difficulty swallowing with tongue swelling arms began to get red and itchy in ambulance. Patient awake alert and cooperative, tongue swollen with difficulty swallowing, muffled speech, MAEx4, does not know what could be having allergic reaction to. MD ROA at bedside code team called.

## 2019-10-14 ENCOUNTER — INPATIENT (INPATIENT)
Facility: HOSPITAL | Age: 84
LOS: 3 days | Discharge: NURSING, REHAB OR LONG-TERM CARE | DRG: 309 | End: 2019-10-18
Attending: HOSPITALIST | Admitting: INTERNAL MEDICINE
Payer: MEDICARE

## 2019-10-14 VITALS
RESPIRATION RATE: 18 BRPM | OXYGEN SATURATION: 96 % | TEMPERATURE: 98 F | WEIGHT: 119.93 LBS | HEART RATE: 102 BPM | DIASTOLIC BLOOD PRESSURE: 55 MMHG | HEIGHT: 62 IN | SYSTOLIC BLOOD PRESSURE: 120 MMHG

## 2019-10-14 PROBLEM — R60.9 EDEMA, UNSPECIFIED: Chronic | Status: ACTIVE | Noted: 2019-06-13

## 2019-10-14 LAB
ALBUMIN SERPL ELPH-MCNC: 2.5 G/DL — LOW (ref 3.3–5.2)
ALP SERPL-CCNC: 91 U/L — SIGNIFICANT CHANGE UP (ref 40–120)
ALT FLD-CCNC: 11 U/L — SIGNIFICANT CHANGE UP
ANION GAP SERPL CALC-SCNC: 14 MMOL/L — SIGNIFICANT CHANGE UP (ref 5–17)
APTT BLD: 28.8 SEC — SIGNIFICANT CHANGE UP (ref 27.5–36.3)
AST SERPL-CCNC: 29 U/L — SIGNIFICANT CHANGE UP
BILIRUB SERPL-MCNC: 0.3 MG/DL — LOW (ref 0.4–2)
BUN SERPL-MCNC: 24 MG/DL — HIGH (ref 8–20)
CALCIUM SERPL-MCNC: 8.9 MG/DL — SIGNIFICANT CHANGE UP (ref 8.6–10.2)
CHLORIDE SERPL-SCNC: 98 MMOL/L — SIGNIFICANT CHANGE UP (ref 98–107)
CO2 SERPL-SCNC: 23 MMOL/L — SIGNIFICANT CHANGE UP (ref 22–29)
CREAT SERPL-MCNC: 1.12 MG/DL — SIGNIFICANT CHANGE UP (ref 0.5–1.3)
GLUCOSE SERPL-MCNC: 83 MG/DL — SIGNIFICANT CHANGE UP (ref 70–115)
HCT VFR BLD CALC: 32.4 % — LOW (ref 34.5–45)
HGB BLD-MCNC: 10.6 G/DL — LOW (ref 11.5–15.5)
INR BLD: 1.05 RATIO — SIGNIFICANT CHANGE UP (ref 0.88–1.16)
MCHC RBC-ENTMCNC: 26.2 PG — LOW (ref 27–34)
MCHC RBC-ENTMCNC: 32.7 GM/DL — SIGNIFICANT CHANGE UP (ref 32–36)
MCV RBC AUTO: 80.2 FL — SIGNIFICANT CHANGE UP (ref 80–100)
NT-PROBNP SERPL-SCNC: 1214 PG/ML — HIGH (ref 0–300)
PLATELET # BLD AUTO: 219 K/UL — SIGNIFICANT CHANGE UP (ref 150–400)
POTASSIUM SERPL-MCNC: 4.9 MMOL/L — SIGNIFICANT CHANGE UP (ref 3.5–5.3)
POTASSIUM SERPL-SCNC: 4.9 MMOL/L — SIGNIFICANT CHANGE UP (ref 3.5–5.3)
PROT SERPL-MCNC: 7.2 G/DL — SIGNIFICANT CHANGE UP (ref 6.6–8.7)
PROTHROM AB SERPL-ACNC: 12.1 SEC — SIGNIFICANT CHANGE UP (ref 10–12.9)
RBC # BLD: 4.04 M/UL — SIGNIFICANT CHANGE UP (ref 3.8–5.2)
RBC # FLD: 16.5 % — HIGH (ref 10.3–14.5)
SODIUM SERPL-SCNC: 135 MMOL/L — SIGNIFICANT CHANGE UP (ref 135–145)
TROPONIN T SERPL-MCNC: <0.01 NG/ML — SIGNIFICANT CHANGE UP (ref 0–0.06)
WBC # BLD: 11.42 K/UL — HIGH (ref 3.8–10.5)
WBC # FLD AUTO: 11.42 K/UL — HIGH (ref 3.8–10.5)

## 2019-10-14 PROCEDURE — 99218: CPT

## 2019-10-14 PROCEDURE — 71045 X-RAY EXAM CHEST 1 VIEW: CPT | Mod: 26

## 2019-10-14 RX ORDER — METOPROLOL TARTRATE 50 MG
50 TABLET ORAL ONCE
Refills: 0 | Status: COMPLETED | OUTPATIENT
Start: 2019-10-14 | End: 2019-10-14

## 2019-10-14 RX ORDER — FUROSEMIDE 40 MG
40 TABLET ORAL
Refills: 0 | Status: DISCONTINUED | OUTPATIENT
Start: 2019-10-14 | End: 2019-10-15

## 2019-10-14 RX ADMIN — Medication 50 MILLIGRAM(S): at 18:11

## 2019-10-14 NOTE — ED ADULT TRIAGE NOTE - CHIEF COMPLAINT QUOTE
Patient arrived via EMS, awake alert, baseline mental status, breathing unlabored.  Patient sent to ED by home health aide due to "irregular heart rate"  Patient states dizziness which has been present for weeks

## 2019-10-14 NOTE — ED PROVIDER NOTE - OBJECTIVE STATEMENT
84 year old female with PMh HTN, HLD, lympehedema presents with irregular heart beat. Pt states that her visiting nurse came for her chronic lymphedema, and noticed that her heart rate was rapid and irregular. Pt states that 2-3 days ago. She was having palpitations, but gibbons snot have any currently, and also no associated chest pain, SOB, fever, chills. cough. 84 year old female with PMh HTN, HLD, lymphedema presents with irregular heart beat. Pt states that her visiting nurse came for her chronic lymphedema, and noticed that her heart rate was rapid and irregular. Pt states that 2-3 days ago. She was having palpitations, but gibbons snot have any currently, and also no associated chest pain, SOB, fever, chills. cough.

## 2019-10-14 NOTE — ED PROVIDER NOTE - CLINICAL SUMMARY MEDICAL DECISION MAKING FREE TEXT BOX
pt with afib and converted to NSR. NO AC as per cardio. They advise tele overnight. They also state recent echo in office, no need for echo now. Advise lasix BID and will re-eval in am

## 2019-10-14 NOTE — ED ADULT NURSE NOTE - CHPI ED NUR SYMPTOMS NEG
no chills/no dizziness/no fever/no diaphoresis/no back pain/no congestion/no nausea/no shortness of breath/no syncope/no vomiting

## 2019-10-14 NOTE — CONSULT NOTE ADULT - ASSESSMENT
Ms Quiñones is an 84 y.o. female with a history of hypertension, osteoarthritis, chronic HFpEF and lymphedema which has been complicated by celullitis for which she was admitted 8/26/19 who recently presented to vascular surgery 10/9/19 with recurrent severe stasis changes with weeping wounds who is now referred to the ER by her visiting nurse for the management of tachycardia found to have new onset atrial fibrillation.    pAF  [id number] >2  - poor systemic AC candidate given chronic wounds and recurrent falls  - metoprolol XL 25mg daily    Bilateral LE edema due to lymphedema   - lasix 40mg IV Q12   - no evidence of acute infection at this time  - recommend vascular eval for [place] boot management

## 2019-10-14 NOTE — ED ADULT NURSE NOTE - PERIPHERAL VASCULAR WDL
----- Message from Thelma Pérez MD sent at 6/18/2018  7:59 AM EDT -----  Please call patient. Her sputum culture just grew normal alex so no changes are needed.   Thank you,  Dr. Iza Nash Pulses equal bilaterally, no edema present.

## 2019-10-14 NOTE — CONSULT NOTE ADULT - SUBJECTIVE AND OBJECTIVE BOX
[facility], [facility]                [address] [place] [address]                                     PHONE: [phone]                                       FAX: [phone]  [email or web address]      Reason for Consult: AF    HPI:  Ms Quiñones is an 84 y.o. female with a history of hypertension, osteoarthritis, chronic HFpEF and lymphedema which has been complicated by celullitis for which she was admitted 8/26/19 who recently presented to vascular surgery 10/9/19 with recurrent severe stasis changes with weeping wounds who is now referred to the ER by her visiting nurse for the management of tachycardia found to have new onset trial fibrillation. She continues to have Unna boots in place for the management of her edema without reported improved. She is additionally increasingly anxious since discussion of amputation with vascular surgery. She denies orthopnea and chest pain but endorses shortness of breath    PAST MEDICAL & SURGICAL HISTORY:  Edema  Colitis  Hypertension  Hyperlipemia  Glaucoma  Cataract of left eye    Telemetry: sinus tachycardia with PACs and PVCs    PREVIOUS DIAGNOSTIC TESTING:      EKG: pending     ECHO FINDINGS: 8/8/19: Normal left ventricular cavity size and wall thickness. Normal left ventricular systolic ejection fraction with an EF of 55-60%. There is no regional wall motion abnormality. Grade 2 diastolic dysfunction with elevated left atrial pressure. There is biatrial dilatation. Normal right ventricular size and systolic function. Mild to moderate mitral regurgitation. Moderate tricuspid regurgitation. Mild pulmonary hypertension. Tricuspid aortic valve with mildly thickened leaflets without stenosis. Trace aortic regurgitation. No pericardial effusion.     MEDICATIONS  (STANDING):  Home Medications:  bumetanide 2 mg oral tablet: 1 tab(s) orally once a day (04 Jun 2019 17:22)  Timoptic 0.5% ophthalmic solution: 1 dose(s) to right eye (02 Jun 2019 21:08)  Travatan Z 0.004% ophthalmic solution: 1 drop(s) to each affected eye once a day (in the evening) (02 Jun 2019 21:08)    MEDICATIONS  (PRN):    FAMILY HISTORY: denies premature CAD    SOCIAL HISTORY:  CIGARETTES: denies  ALCOHOL: denies     ROS: Negative other than as mentioned in HPI.    Vital Signs Last 24 Hrs  T(C): 37 (14 Oct 2019 19:32), Max: 37 (14 Oct 2019 19:32)  T(F): 98.6 (14 Oct 2019 19:32), Max: 98.6 (14 Oct 2019 19:32)  HR: 101 (14 Oct 2019 19:32) (96 - 102)  BP: 105/49 (14 Oct 2019 19:32) (105/49 - 120/55)  BP(mean): 71 (14 Oct 2019 19:32) (71 - 71)  RR: 18 (14 Oct 2019 19:32) (17 - 18)  SpO2: 98% (14 Oct 2019 19:32) (96% - 100%)    PHYSICAL EXAM:  General: Appears well developed, well nourished alert and cooperative.  HEENT: Head; normocephalic, atraumatic. sclera anicteric, MMM, no JVD, neck is supple   CARDIOVASCULAR; 2/6 MARGARITA, no rub, gallop or lift. Normal S1 and S2.  LUNGS; No rales, rhonchi or wheeze. Normal breath sounds bilaterally.  ABDOMEN ; Soft, nontender without mass or organomegaly. bowel sounds normoactive.  EXTREMITIES; Unna boot in place with malodorous serous drainage   SKIN; warm and dry with normal turgor.  NEURO; Alert/oriented x 3/normal motor exam.     PSYCH; anxious     LABS:                        10.6   11.42 )-----------( 219      ( 14 Oct 2019 16:36 )             32.4     10-14    135  |  98  |  24.0<H>  ----------------------------<  83  4.9   |  23.0  |  1.12    Ca    8.9      14 Oct 2019 16:36    TPro  7.2  /  Alb  2.5<L>  /  TBili  0.3<L>  /  DBili  x   /  AST  29  /  ALT  11  /  AlkPhos  91  10-14    CARDIAC MARKERS ( 14 Oct 2019 16:36 )  x     / <0.01 ng/mL / x     / x     / x          PT/INR - ( 14 Oct 2019 16:36 )   PT: 12.1 sec;   INR: 1.05 ratio         PTT - ( 14 Oct 2019 16:36 )  PTT:28.8 sec    I&O's Summary      RADIOLOGY & ADDITIONAL STUDIES:

## 2019-10-14 NOTE — ED ADULT NURSE NOTE - OBJECTIVE STATEMENT
84 year old female, PMHx of Colitis, HTN and HLD, presents to the ED from home by EMS. Patient states that she was sent to the ED for evaluation of her heart. Per EMS visiting nurse stated that patient was in new onset afib in the 140's. Patient states that she has been having intermittent midsternal chest discomfort with SOB x 2 days. Patient reports intermittent dizziness at home for a few weeks. Patient denies any dizziness at this time, lightheadedness, N/V/D, fevers or chills. Patient has chronic edema to BLE and has them wrapped at home by nurse. Patient presents with poor hygiene with rash noted to inner thighs, on the monitor patient is in afib in the 120's, respirations are even and non labored, edema noted to BLE with wraps in place. Patient A/Ox3.

## 2019-10-15 DIAGNOSIS — R60.0 LOCALIZED EDEMA: ICD-10-CM

## 2019-10-15 DIAGNOSIS — I48.91 UNSPECIFIED ATRIAL FIBRILLATION: ICD-10-CM

## 2019-10-15 DIAGNOSIS — H40.9 UNSPECIFIED GLAUCOMA: ICD-10-CM

## 2019-10-15 DIAGNOSIS — L08.9 LOCAL INFECTION OF THE SKIN AND SUBCUTANEOUS TISSUE, UNSPECIFIED: ICD-10-CM

## 2019-10-15 LAB
ALBUMIN SERPL ELPH-MCNC: 2.2 G/DL — LOW (ref 3.3–5.2)
ALP SERPL-CCNC: 85 U/L — SIGNIFICANT CHANGE UP (ref 40–120)
ALT FLD-CCNC: 9 U/L — SIGNIFICANT CHANGE UP
ANION GAP SERPL CALC-SCNC: 19 MMOL/L — HIGH (ref 5–17)
AST SERPL-CCNC: 16 U/L — SIGNIFICANT CHANGE UP
BILIRUB SERPL-MCNC: 0.3 MG/DL — LOW (ref 0.4–2)
BUN SERPL-MCNC: 19 MG/DL — SIGNIFICANT CHANGE UP (ref 8–20)
CALCIUM SERPL-MCNC: 8.4 MG/DL — LOW (ref 8.6–10.2)
CHLORIDE SERPL-SCNC: 96 MMOL/L — LOW (ref 98–107)
CO2 SERPL-SCNC: 22 MMOL/L — SIGNIFICANT CHANGE UP (ref 22–29)
CREAT SERPL-MCNC: 1.03 MG/DL — SIGNIFICANT CHANGE UP (ref 0.5–1.3)
GLUCOSE SERPL-MCNC: 65 MG/DL — LOW (ref 70–115)
HIV 1 & 2 AB SERPL IA.RAPID: SIGNIFICANT CHANGE UP
MAGNESIUM SERPL-MCNC: 1.7 MG/DL — SIGNIFICANT CHANGE UP (ref 1.6–2.6)
PHOSPHATE SERPL-MCNC: 4 MG/DL — SIGNIFICANT CHANGE UP (ref 2.4–4.7)
POTASSIUM SERPL-MCNC: 3 MMOL/L — LOW (ref 3.5–5.3)
POTASSIUM SERPL-SCNC: 3 MMOL/L — LOW (ref 3.5–5.3)
PROT SERPL-MCNC: 6.4 G/DL — LOW (ref 6.6–8.7)
SODIUM SERPL-SCNC: 137 MMOL/L — SIGNIFICANT CHANGE UP (ref 135–145)
TROPONIN T SERPL-MCNC: <0.01 NG/ML — SIGNIFICANT CHANGE UP (ref 0–0.06)

## 2019-10-15 PROCEDURE — 99217: CPT

## 2019-10-15 PROCEDURE — 99223 1ST HOSP IP/OBS HIGH 75: CPT

## 2019-10-15 RX ORDER — METOPROLOL TARTRATE 50 MG
25 TABLET ORAL EVERY 6 HOURS
Refills: 0 | Status: DISCONTINUED | OUTPATIENT
Start: 2019-10-15 | End: 2019-10-18

## 2019-10-15 RX ORDER — DILTIAZEM HCL 120 MG
10 CAPSULE, EXT RELEASE 24 HR ORAL ONCE
Refills: 0 | Status: COMPLETED | OUTPATIENT
Start: 2019-10-15 | End: 2019-10-15

## 2019-10-15 RX ORDER — FAMOTIDINE 10 MG/ML
20 INJECTION INTRAVENOUS DAILY
Refills: 0 | Status: DISCONTINUED | OUTPATIENT
Start: 2019-10-15 | End: 2019-10-18

## 2019-10-15 RX ORDER — TIMOLOL 0.5 %
1 DROPS OPHTHALMIC (EYE)
Qty: 0 | Refills: 0 | DISCHARGE

## 2019-10-15 RX ORDER — SODIUM CHLORIDE 9 MG/ML
500 INJECTION INTRAMUSCULAR; INTRAVENOUS; SUBCUTANEOUS ONCE
Refills: 0 | Status: COMPLETED | OUTPATIENT
Start: 2019-10-15 | End: 2019-10-15

## 2019-10-15 RX ORDER — METOPROLOL TARTRATE 50 MG
25 TABLET ORAL DAILY
Refills: 0 | Status: DISCONTINUED | OUTPATIENT
Start: 2019-10-15 | End: 2019-10-15

## 2019-10-15 RX ORDER — POTASSIUM CHLORIDE 20 MEQ
20 PACKET (EA) ORAL
Qty: 0 | Refills: 0 | DISCHARGE

## 2019-10-15 RX ORDER — TIMOLOL 0.5 %
1 DROPS OPHTHALMIC (EYE) DAILY
Refills: 0 | Status: DISCONTINUED | OUTPATIENT
Start: 2019-10-15 | End: 2019-10-18

## 2019-10-15 RX ORDER — LATANOPROST 0.05 MG/ML
1 SOLUTION/ DROPS OPHTHALMIC; TOPICAL AT BEDTIME
Refills: 0 | Status: DISCONTINUED | OUTPATIENT
Start: 2019-10-15 | End: 2019-10-18

## 2019-10-15 RX ORDER — POTASSIUM CHLORIDE 20 MEQ
20 PACKET (EA) ORAL
Refills: 0 | Status: COMPLETED | OUTPATIENT
Start: 2019-10-15 | End: 2019-10-15

## 2019-10-15 RX ORDER — ENOXAPARIN SODIUM 100 MG/ML
30 INJECTION SUBCUTANEOUS DAILY
Refills: 0 | Status: DISCONTINUED | OUTPATIENT
Start: 2019-10-15 | End: 2019-10-18

## 2019-10-15 RX ORDER — TRAVOPROST 0.04 MG/ML
1 SOLUTION/ DROPS OPHTHALMIC
Qty: 0 | Refills: 0 | DISCHARGE

## 2019-10-15 RX ORDER — FUROSEMIDE 40 MG
20 TABLET ORAL
Refills: 0 | Status: DISCONTINUED | OUTPATIENT
Start: 2019-10-15 | End: 2019-10-18

## 2019-10-15 RX ORDER — BUMETANIDE 0.25 MG/ML
2 INJECTION INTRAMUSCULAR; INTRAVENOUS DAILY
Refills: 0 | Status: DISCONTINUED | OUTPATIENT
Start: 2019-10-15 | End: 2019-10-15

## 2019-10-15 RX ORDER — POTASSIUM CHLORIDE 20 MEQ
10 PACKET (EA) ORAL ONCE
Refills: 0 | Status: COMPLETED | OUTPATIENT
Start: 2019-10-15 | End: 2019-10-15

## 2019-10-15 RX ADMIN — Medication 1 TABLET(S): at 18:43

## 2019-10-15 RX ADMIN — Medication 1 DROP(S): at 12:38

## 2019-10-15 RX ADMIN — Medication 40 MILLIGRAM(S): at 05:11

## 2019-10-15 RX ADMIN — Medication 1 APPLICATION(S): at 18:44

## 2019-10-15 RX ADMIN — Medication 10 MILLIGRAM(S): at 08:45

## 2019-10-15 RX ADMIN — SODIUM CHLORIDE 500 MILLILITER(S): 9 INJECTION INTRAMUSCULAR; INTRAVENOUS; SUBCUTANEOUS at 08:45

## 2019-10-15 RX ADMIN — Medication 20 MILLIEQUIVALENT(S): at 14:06

## 2019-10-15 RX ADMIN — Medication 25 MILLIGRAM(S): at 23:46

## 2019-10-15 RX ADMIN — ENOXAPARIN SODIUM 30 MILLIGRAM(S): 100 INJECTION SUBCUTANEOUS at 12:37

## 2019-10-15 RX ADMIN — Medication 40 MILLIGRAM(S): at 00:38

## 2019-10-15 RX ADMIN — BUMETANIDE 2 MILLIGRAM(S): 0.25 INJECTION INTRAMUSCULAR; INTRAVENOUS at 05:21

## 2019-10-15 RX ADMIN — Medication 100 MILLIEQUIVALENT(S): at 12:36

## 2019-10-15 RX ADMIN — LATANOPROST 1 DROP(S): 0.05 SOLUTION/ DROPS OPHTHALMIC; TOPICAL at 21:18

## 2019-10-15 RX ADMIN — Medication 20 MILLIEQUIVALENT(S): at 18:44

## 2019-10-15 RX ADMIN — Medication 25 MILLIGRAM(S): at 05:21

## 2019-10-15 RX ADMIN — Medication 25 MILLIGRAM(S): at 12:36

## 2019-10-15 RX ADMIN — Medication 20 MILLIEQUIVALENT(S): at 12:36

## 2019-10-15 RX ADMIN — Medication 20 MILLIGRAM(S): at 18:44

## 2019-10-15 RX ADMIN — FAMOTIDINE 20 MILLIGRAM(S): 10 INJECTION INTRAVENOUS at 12:36

## 2019-10-15 NOTE — ED ADULT NURSE REASSESSMENT NOTE - COMFORT CARE
plan of care explained/warm blanket provided/repositioned/wait time explained/side rails up/treatment delay explained

## 2019-10-15 NOTE — ED CDU PROVIDER INITIAL DAY NOTE - OBJECTIVE STATEMENT
84 year old female with PMh HTN, HLD, lymphedema presents with irregular heart beat. Pt states that her visiting nurse came for her chronic lymphedema, and noticed that her heart rate was rapid and irregular. Pt states that 2-3 days ago. She was having palpitations, but gibbons snot have any currently, and also no associated chest pain, SOB, fever, chills. cough.

## 2019-10-15 NOTE — H&P ADULT - PROBLEM SELECTOR PLAN 2
Clotrimazole 1% cream twice a day x 3 weeks. repeat as needs. Bactrim DS  tablet po twice a a day x 2 weeks. Soxcial work consult as patient needs help with body wash/Bath

## 2019-10-15 NOTE — CONSULT NOTE ADULT - SUBJECTIVE AND OBJECTIVE BOX
Vascular Surgery    HPI: 84y Female with PMH of lymphedema, cellulitis, and HTN that was found to be tachycardic by her home nurse and brought in the ED because of that finding. Upon workup in the ED she was found to have new onset Afib, reason to be admitted and followed by cardiology. Patient has no active complaints, denies chest pain and sob but states that her lymphedema has not improved since last unna boot change. Vascular surgery was consulted for management of Unna boots. Patient currently follows up with Dr. Finch as outpatient who is currently managing her lymphedema. Denies fever, chills, nausea and vomiting.     ROS: 10-system review is otherwise negative except HPI above.      PAST MEDICAL & SURGICAL HISTORY:  Edema  Colitis  Hypertension  Hyperlipemia  Glaucoma  Cataract of left eye      ALLERGIES: NKA ACE inhibitors (Angioedema; Blisters; Anaphylaxis)  Benzalkonium Chloride (Unknown)  lactose (Other)  silvadene        HOME MEDICATIONS:   bumetanide 2 mg oral tablet: 1 tab(s) orally once a day (15 Oct 2019 04:02)  potassium chloride 20 mEq oral granule, extended release: 20  orally 2 times a day (15 Oct 2019 04:02)  Timoptic 0.5% ophthalmic solution: 1 dose(s) to right eye (15 Oct 2019 04:02)  Travatan Z 0.004% ophthalmic solution: 1 drop(s) to each affected eye once a day (in the evening) (15 Oct 2019 04:02)  -------------------------------------------------------------------------------------------  PHYSICAL EXAM:   General: NAD, Lying in bed comfortably  Neuro: A+Ox3  HEENT: EOMI, PERRLA, MMM     Cardio: RRR   Resp: Non labored breathing   GI/Abd: Soft, NT/ND, no rebound/guarding, no masses palpated  Musculoskeletal: B/L lower extremity lymphedema with unna boots. Non blanching erythema on b/l feet.   --------------------------------------------------------------------------------------------    LABS                 10.6   11.42  )----------(  219       ( 14 Oct 2019 16:36 )               32.4      135    |  98     |  24.0   ----------------------------<  83         ( 14 Oct 2019 16:36 )  4.9     |  23.0   |  1.12     Ca    8.9        ( 14 Oct 2019 16:36 )    TPro  7.2    /  Alb  2.5    /  TBili  0.3    /  DBili  x      /  AST  29     /  ALT  11     /  AlkPhos  91     ( 14 Oct 2019 16:36 )    LIVER FUNCTIONS - ( 14 Oct 2019 16:36 )  Alb: 2.5 g/dL / Pro: 7.2 g/dL / ALK PHOS: 91 U/L / ALT: 11 U/L / AST: 29 U/L / GGT: x           PT/INR -  12.1 sec / 1.05 ratio   ( 14 Oct 2019 16:36 )       PTT -  28.8 sec   ( 14 Oct 2019 16:36 )  CAPILLARY BLOOD GLUCOSE    CARDIAC MARKERS ( 15 Oct 2019 00:46 )  x     / <0.01 ng/mL / x     / x     / x      CARDIAC MARKERS ( 14 Oct 2019 16:36 )  x     / <0.01 ng/mL / x     / x     / x      --------------------------------------------------------------------------------------------

## 2019-10-15 NOTE — CONSULT NOTE ADULT - ASSESSMENT
ASSESSMENT: Patient is a 84y old female with new onset Afib admitted to IM and started on metoprolol 25mg daily. Vascular surgery consulted for lymphedema that is currently being followed by Dr. Finch as outpatient.     PLAN:    - LEG ELEVATION  - Unna boot 10/16 after AM rounds with Dr. Finch. Patient had a silvadene allergy in the past.  - rest of are per primary team

## 2019-10-15 NOTE — CHART NOTE - NSCHARTNOTEFT_GEN_A_CORE
Called by RN to assess pt post fall.  Pt admitted with newly diagnosed A.saturnino had an unwitnessed fall.  As per NA, pt was found on floor sitting few feet away from stretcher.  Pt stated she tried to go to BR and walked few feet away from stretcher, lost balance and fell, landed on her bottom, claims she hit her head on linen cart.  Pt has no complaints.  ICU Vital Signs Last 24 Hrs  T(C): 36.9 (15 Oct 2019 21:49), Max: 37 (15 Oct 2019 08:00)  T(F): 98.5 (15 Oct 2019 21:49), Max: 98.6 (15 Oct 2019 08:00)  HR: 115 (15 Oct 2019 21:49) (78 - 123)  BP: 101/66 (15 Oct 2019 21:49) (90/56 - 118/91)  BP(mean): 76 (15 Oct 2019 04:17) (71 - 76)  RR: 18 (15 Oct 2019 21:49) (16 - 18)  SpO2: 97% (15 Oct 2019 21:49) (97% - 99%)  General - Pt is alert & Ox3, pt being witty  HEAD      NC, AT, PERRL EOM intact no ear d/c  Lungs      CTA  Heart       s1/s2 irregular  Neuro      CN II -XII grossly intact, sensory/motor intact  Ext           Dressing in place b/l LE; skin abrasion with no active bleeding right hand digits, DSD applied  S/P Fall ? head trauma  CT scan head no contrast stat  Neuro check q 4hrs x 4 then prn after seen by MD  RN to observe, reassess and escalate prn.

## 2019-10-15 NOTE — H&P ADULT - ASSESSMENT
83 y/o female with A fib, skin infection (combined bacterial and candida),  Hypokalemia, chronic lower ext edema, Glaucoma

## 2019-10-15 NOTE — ED CDU PROVIDER DISPOSITION NOTE - CLINICAL COURSE
84 year old female with PMh HTN, HLD, lymphedema presents with irregular heart beat. Pt states that her visiting nurse came for her chronic lymphedema, and noticed that her heart rate was rapid and irregular. Pt states that 2-3 days ago. She was having palpitations.  Was given lopressor po and converted.  Troponin negative.  Patient is lying comfortably in bed however she is in rapid a-fib on the CM and hypotensive to 90/p.  Gently hydrating with 500cc NS, cardizem 5mg IVP, Dr Murphy cardiology at bedside.  repeat cmp, magnesium and phosphorus sent.  Patient has ZOILA boot on, our vascular team was consulted for management however the patient sees Dr Cameron Finch.  Savanah HUANG vascular recommends removing as they have been on 5 days and consult Dr Finch.  Report and care given to Dr Arroyo.

## 2019-10-15 NOTE — ED CDU PROVIDER INITIAL DAY NOTE - MEDICAL DECISION MAKING DETAILS
84 year old female with PMh HTN, HLD, lymphedema presented to ED in A-fib. Patient evaluated by cardiology who recommended admission to CDU for telemetry monitoring overnight with morning reassessment. Additionally recommended vascular consult. Further management plan consult recommendations.

## 2019-10-15 NOTE — PROGRESS NOTE ADULT - ASSESSMENT
1. 85 yo F with recurrent and paroxysmal afib associated with low bp. _pt w/o symptoms. Will increase lopressor to 25 q6h and consider amio therapy if rate control or restoration of SR doesn't occur.-Pt is a poor risk for chronic oral AC Given her weeping edema and fall risk.  2. hx of HFpEF-echo to reassess  3. lymph edema  4. moderate anemia.

## 2019-10-15 NOTE — H&P ADULT - PROBLEM SELECTOR PLAN 1
BP dropped to 92/42. with reduce the lasix dose to 20 mg iv twice a a day. metoprolol 25 mg po q 6 hrs. [facility]

## 2019-10-15 NOTE — ED ADULT NURSE REASSESSMENT NOTE - NS ED NURSE REASSESS COMMENT FT1
Pt diaper changed and repositioned for comfort. Pt on cardiac monitor with periods of afib. Pt on observations, awaiting cardio consult in AM. Pt aware of plan of care, call bell within reach. Pt states no pain or discomfort at this time.
Pt diaper changed with Rn and nursing aide, repositioned for comfort. Call bell within reach. Awaiting cardio consult in Am. Pt aware of plan of care.
Pt received from day staff, awake and alert laying in bed on cardiac monitor. Pt states no pain or discomfort at this time. Nursing aide to come perform repeat EKG, then all orders complete and waiting for further MD orders. Call bell within reach. BLE wrapped, pt states she get them wrapped with ace bandage at home. Noticeable odor and discharge from BLE weeping. MD aware. Pt repositioned for comfort.
Pt repositioned for comfort. Cardio at bedside for pt eval at this time.
Pt resting in bed at this time on cardiac monitor. Pt aware she will be staying the night on observation. Pt states no pain or discomfort at this time. Call bell within reach.
LAte entry : Pt received in the stretcher resting comfortably, no distress noted, no chest pain reported, Yanni NP at the bedside to eval pt , pt noted to be hypotensive, fluids initiated as per NP order , HR noted to be in 120's . Meds ordered, awaiting pharmacy verfication, will continue to monitored

## 2019-10-15 NOTE — PROGRESS NOTE ADULT - SUBJECTIVE AND OBJECTIVE BOX
Chief Complaint: Called to see pt with rapid afib and hypotension.    HPI: chart and hx reviewed. 83 yo F lives alone-sent to ER by visiting nurse for rapid afib-pt has hx of HFpEF/hpt and lymphedema but no hx of arrhythmia or ischemic heart disease. Only surgery-eyes/no allergy/nonsmoker/etoh. Pt currently in afib vr 115 but unaware of it and in no distress.    PAST MEDICAL & SURGICAL HISTORY:  Edema  Colitis  Hypertension  Hyperlipemia  Glaucoma  Cataract of left eye      PREVIOUS DIAGNOSTIC TESTING:      ECHO  FINDINGS: pending    STRESS  FINDINGS:    CATHETERIZATION  FINDINGS:    MEDICATIONS  (STANDING):  buMETAnide 2 milliGRAM(s) Oral daily  diltiazem Injectable 10 milliGRAM(s) IV Push once  furosemide   Injectable 40 milliGRAM(s) IV Push two times a day  latanoprost 0.005% Ophthalmic Solution 1 Drop(s) Right EYE at bedtime  metoprolol succinate ER 25 milliGRAM(s) Oral daily  sodium chloride 0.9% Bolus 500 milliLiter(s) IV Bolus once  timolol 0.5% Solution 1 Drop(s) Right EYE daily    MEDICATIONS  (PRN):      FAMILY HISTORY: non contrib      ROS: Negative other than as mentioned in HPI.    Vital Signs Last 24 Hrs  T(C): 37 (15 Oct 2019 08:00), Max: 37 (14 Oct 2019 19:32)  T(F): 98.6 (15 Oct 2019 08:00), Max: 98.6 (14 Oct 2019 19:32)  HR: 115 irreg (15 Oct 2019 08:00) (93 - 123)  BP: 90 systolic manually la (15 Oct 2019 08:00) (90/56 - 120/55)  BP(mean): 76 (15 Oct 2019 04:17) (71 - 76)  RR: 14 flat ora (15 Oct 2019 08:00) (16 - 18)  SpO2: 98% (15 Oct 2019 08:00) (96% - 100%)    PHYSICAL EXAM:  General: Appears well developed, well nourished alert and cooperative. Elderly afebrile F nad.  HEENT: Head; normocephalic, atraumatic.  Eyes;   Pupils reactive, cornea wnl.  Neck; Supple, no nodes adenopathy, no NVD or carotid bruit or thyromegaly.  CARDIOVASCULAR; No murmur, rub, gallop or lift. Normal S1 and S2. irreg rhythm  LUNGS; No rales, rhonchi or wheeze. Normal breath sounds bilaterally.  ABDOMEN ; Soft, nontender without mass or organomegaly. bowel sounds normoactive. no surgical scars.  EXTREMITIES; bilateral luz boots with weeping edema.  SKIN; warm and dry with normal turgor. (aside from legs).  NEURO; Alert/oriented x 3/normal motor exam.   PSYCH; normal affect.            INTERPRETATION OF TELEMETRY:    ECG: Afib   cxr reviewed. nl cor and lungs ca++ aorta    I&O's Detail      LABS:                        10.6   11.42 )-----------( 219      ( 14 Oct 2019 16:36 )             32.4     10-14    135  |  98  |  24.0<H>  ----------------------------<  83  4.9   |  23.0  |  1.12    Ca    8.9      14 Oct 2019 16:36    TPro  7.2  /  Alb  2.5<L>  /  TBili  0.3<L>  /  DBili  x   /  AST  29  /  ALT  11  /  AlkPhos  91  10-14    CARDIAC MARKERS ( 15 Oct 2019 00:46 )  x     / <0.01 ng/mL / x     / x     / x      CARDIAC MARKERS ( 14 Oct 2019 16:36 )  x     / <0.01 ng/mL / x     / x     / x          PT/INR - ( 14 Oct 2019 16:36 )   PT: 12.1 sec;   INR: 1.05 ratio         PTT - ( 14 Oct 2019 16:36 )  PTT:28.8 sec    I&O's Summary      RADIOLOGY & ADDITIONAL STUDIES:

## 2019-10-15 NOTE — ED CDU PROVIDER INITIAL DAY NOTE - ATTENDING CONTRIBUTION TO CARE
84yoF; with pmh signif for HTN, HLD, CHF, chronic lymphedema; now p/w palpitations, found to be in New Onset Afib, converted back to sinus  -cardiac monitor  -cardiology consult

## 2019-10-16 LAB
ALBUMIN SERPL ELPH-MCNC: 2.3 G/DL — LOW (ref 3.3–5.2)
ALP SERPL-CCNC: 78 U/L — SIGNIFICANT CHANGE UP (ref 40–120)
ALT FLD-CCNC: 9 U/L — SIGNIFICANT CHANGE UP
ANION GAP SERPL CALC-SCNC: 14 MMOL/L — SIGNIFICANT CHANGE UP (ref 5–17)
AST SERPL-CCNC: 16 U/L — SIGNIFICANT CHANGE UP
BASOPHILS # BLD AUTO: 0.04 K/UL — SIGNIFICANT CHANGE UP (ref 0–0.2)
BASOPHILS NFR BLD AUTO: 0.3 % — SIGNIFICANT CHANGE UP (ref 0–2)
BILIRUB SERPL-MCNC: <0.2 MG/DL — LOW (ref 0.4–2)
BUN SERPL-MCNC: 22 MG/DL — HIGH (ref 8–20)
CALCIUM SERPL-MCNC: 8.2 MG/DL — LOW (ref 8.6–10.2)
CHLORIDE SERPL-SCNC: 100 MMOL/L — SIGNIFICANT CHANGE UP (ref 98–107)
CO2 SERPL-SCNC: 26 MMOL/L — SIGNIFICANT CHANGE UP (ref 22–29)
CREAT SERPL-MCNC: 1.25 MG/DL — SIGNIFICANT CHANGE UP (ref 0.5–1.3)
EOSINOPHIL # BLD AUTO: 0.26 K/UL — SIGNIFICANT CHANGE UP (ref 0–0.5)
EOSINOPHIL NFR BLD AUTO: 2 % — SIGNIFICANT CHANGE UP (ref 0–6)
GLUCOSE SERPL-MCNC: 96 MG/DL — SIGNIFICANT CHANGE UP (ref 70–115)
HCT VFR BLD CALC: 30.2 % — LOW (ref 34.5–45)
HGB BLD-MCNC: 9.7 G/DL — LOW (ref 11.5–15.5)
IMM GRANULOCYTES NFR BLD AUTO: 0.4 % — SIGNIFICANT CHANGE UP (ref 0–1.5)
LYMPHOCYTES # BLD AUTO: 1.19 K/UL — SIGNIFICANT CHANGE UP (ref 1–3.3)
LYMPHOCYTES # BLD AUTO: 9.2 % — LOW (ref 13–44)
MAGNESIUM SERPL-MCNC: 1.7 MG/DL — SIGNIFICANT CHANGE UP (ref 1.6–2.6)
MCHC RBC-ENTMCNC: 26.1 PG — LOW (ref 27–34)
MCHC RBC-ENTMCNC: 32.1 GM/DL — SIGNIFICANT CHANGE UP (ref 32–36)
MCV RBC AUTO: 81.4 FL — SIGNIFICANT CHANGE UP (ref 80–100)
MONOCYTES # BLD AUTO: 1.03 K/UL — HIGH (ref 0–0.9)
MONOCYTES NFR BLD AUTO: 7.9 % — SIGNIFICANT CHANGE UP (ref 2–14)
NEUTROPHILS # BLD AUTO: 10.42 K/UL — HIGH (ref 1.8–7.4)
NEUTROPHILS NFR BLD AUTO: 80.2 % — HIGH (ref 43–77)
PLATELET # BLD AUTO: 190 K/UL — SIGNIFICANT CHANGE UP (ref 150–400)
POTASSIUM SERPL-MCNC: 3.5 MMOL/L — SIGNIFICANT CHANGE UP (ref 3.5–5.3)
POTASSIUM SERPL-SCNC: 3.5 MMOL/L — SIGNIFICANT CHANGE UP (ref 3.5–5.3)
PROT SERPL-MCNC: 6.2 G/DL — LOW (ref 6.6–8.7)
RBC # BLD: 3.71 M/UL — LOW (ref 3.8–5.2)
RBC # FLD: 16.5 % — HIGH (ref 10.3–14.5)
SODIUM SERPL-SCNC: 140 MMOL/L — SIGNIFICANT CHANGE UP (ref 135–145)
WBC # BLD: 12.99 K/UL — HIGH (ref 3.8–10.5)
WBC # FLD AUTO: 12.99 K/UL — HIGH (ref 3.8–10.5)

## 2019-10-16 PROCEDURE — 70450 CT HEAD/BRAIN W/O DYE: CPT | Mod: 26

## 2019-10-16 PROCEDURE — 99232 SBSQ HOSP IP/OBS MODERATE 35: CPT

## 2019-10-16 RX ORDER — HALOPERIDOL DECANOATE 100 MG/ML
2 INJECTION INTRAMUSCULAR ONCE
Refills: 0 | Status: COMPLETED | OUTPATIENT
Start: 2019-10-16 | End: 2019-10-16

## 2019-10-16 RX ADMIN — HALOPERIDOL DECANOATE 2 MILLIGRAM(S): 100 INJECTION INTRAMUSCULAR at 00:58

## 2019-10-16 RX ADMIN — Medication 25 MILLIGRAM(S): at 05:13

## 2019-10-16 RX ADMIN — ENOXAPARIN SODIUM 30 MILLIGRAM(S): 100 INJECTION SUBCUTANEOUS at 18:39

## 2019-10-16 RX ADMIN — Medication 1 APPLICATION(S): at 05:13

## 2019-10-16 RX ADMIN — Medication 20 MILLIGRAM(S): at 18:40

## 2019-10-16 RX ADMIN — Medication 25 MILLIGRAM(S): at 23:09

## 2019-10-16 RX ADMIN — FAMOTIDINE 20 MILLIGRAM(S): 10 INJECTION INTRAVENOUS at 18:40

## 2019-10-16 RX ADMIN — Medication 1 APPLICATION(S): at 19:07

## 2019-10-16 RX ADMIN — Medication 25 MILLIGRAM(S): at 18:40

## 2019-10-16 RX ADMIN — LATANOPROST 1 DROP(S): 0.05 SOLUTION/ DROPS OPHTHALMIC; TOPICAL at 23:08

## 2019-10-16 RX ADMIN — Medication 1 TABLET(S): at 05:13

## 2019-10-16 RX ADMIN — Medication 1 DROP(S): at 18:40

## 2019-10-16 NOTE — PROGRESS NOTE ADULT - SUBJECTIVE AND OBJECTIVE BOX
[facility], THE [facility]ART CENTER                                   [address] [place] [address]                                                      PHONE: [phone]                                                         FAX: [phone]  [email or web address]  ---------------------------------------------------------------------------------------------------------------------------------    Overnight events/patient complaints: No acute events     INTERPRETATION OF TELEMETRY (personally reviewed):    ECG: < from: 12 Lead ECG (10.15.19 @ 08:13) > Atrial fibrillation with rapid ventricular response. Nonspecific T wave abnormality    ECHO: pending    PAST MEDICAL & SURGICAL HISTORY:  Edema  Colitis  Hypertension  Hyperlipemia  Glaucoma  Cataract of left eye      ACE inhibitors (Angioedema; Blisters; Anaphylaxis)  Benzalkonium Chloride (Unknown)  lactose (Other)    MEDICATIONS  (STANDING):  clotrimazole 1% Cream 1 Application(s) Topical two times a day  enoxaparin Injectable 30 milliGRAM(s) SubCutaneous daily  famotidine    Tablet 20 milliGRAM(s) Oral daily  furosemide   Injectable 20 milliGRAM(s) IV Push two times a day  latanoprost 0.005% Ophthalmic Solution 1 Drop(s) Right EYE at bedtime  metoprolol tartrate 25 milliGRAM(s) Oral every 6 hours  timolol 0.5% Solution 1 Drop(s) Right EYE daily  trimethoprim  160 mG/sulfamethoxazole 800 mG 1 Tablet(s) Oral two times a day    MEDICATIONS  (PRN):  aluminum hydroxide/magnesium hydroxide/simethicone Suspension 30 milliLiter(s) Oral every 6 hours PRN Dyspepsia      Vital Signs Last 24 Hrs  T(C): 36.3 (16 Oct 2019 08:14), Max: 36.9 (15 Oct 2019 16:09)  T(F): 97.4 (16 Oct 2019 08:14), Max: 98.5 (15 Oct 2019 20:04)  HR: 76 (16 Oct 2019 08:14) (76 - 115)  BP: 129/65 (16 Oct 2019 08:14) (91/55 - 129/65)  BP(mean): --  RR: 18 (16 Oct 2019 08:14) (18 - 18)  SpO2: 98% (16 Oct 2019 08:14) (97% - 99%)  ICU Vital Signs Last 24 Hrs    PHYSICAL EXAM:  General: Appears well developed, well nourished alert and cooperative.  HEENT: Head; normocephalic, atraumatic.Pupils reactive, cornea wnl. Neck supple, no nodes adenopathy, no JVD  CARDIOVASCULAR: Normal S1 and S2, 1/6 MARGARITA, no rub, gallop or lift.   LUNGS: No rales, rhonchi or wheeze. Normal breath sounds bilaterally.  ABDOMEN: Soft, nontender without mass or organomegaly. bowel sounds normoactive.  EXTREMITIES: No clubbing, cyanosis or edema.   SKIN: warm and dry with normal turgor.  NEURO: Alert/oriented x 3/normal motor exam.   PSYCH: normal affect.        LABS:                        9.7    12.99 )-----------( 190      ( 16 Oct 2019 06:43 )             30.2     10-16    140  |  100  |  22.0<H>  ----------------------------<  96  3.5   |  26.0  |  1.25    Ca    8.2<L>      16 Oct 2019 06:43  Phos  4.0     10-15  Mg     1.7     10-16    TPro  6.2<L>  /  Alb  2.3<L>  /  TBili  <0.2<L>  /  DBili  x   /  AST  16  /  ALT  9   /  AlkPhos  78  10-16    ANN QUIÑONES  CARDIAC MARKERS ( 15 Oct 2019 00:46 )  x     / <0.01 ng/mL / x     / x     / x      CARDIAC MARKERS ( 14 Oct 2019 16:36 )  x     / <0.01 ng/mL / x     / x     / x          PT/INR - ( 14 Oct 2019 16:36 )   PT: 12.1 sec;   INR: 1.05 ratio         PTT - ( 14 Oct 2019 16:36 )  PTT:28.8 sec      RADIOLOGY & ADDITIONAL STUDIES:    ASSESSMENT AND PLAN:  Ms Quiñones is an 84 y.o. female with a history of hypertension, osteoarthritis, chronic HFpEF and lymphedema which has been complicated by celullitis for which she was admitted 8/26/19 who recently presented to vascular surgery 10/9/19 with recurrent severe stasis changes with weeping wounds who is now referred to the ER by her visiting nurse for the management of tachycardia found to have new onset atrial fibrillation.    pAF  [id number] >2  - poor systemic AC candidate given chronic wounds and recurrent falls  - metoprolol XL 25mg daily    Bilateral LE edema due to lymphedema   - lasix 40mg IV Q12   - no evidence of acute infection at this time  - recommend vascular eval for Unna boot management     Thank you for allowing [facility] to participate in the care of this patient.  Please feel free to call with any questions or concerns. [place] [facility], [facility]                                   [address] [place] [address]                                                      PHONE: [phone]                                                         FAX: [phone]  [email or web address]  ---------------------------------------------------------------------------------------------------------------------------------    Overnight events/patient complaints: Overnight with confusion and fall out of bed     declined telemetry monitoring     ECG: < from: 12 Lead ECG (10.15.19 @ 08:13) > Atrial fibrillation with rapid ventricular response. Nonspecific T wave abnormality    ECHO: pending    PAST MEDICAL & SURGICAL HISTORY:  Edema  Colitis  Hypertension  Hyperlipemia  Glaucoma  Cataract of left eye      ACE inhibitors (Angioedema; Blisters; Anaphylaxis)  Benzalkonium Chloride (Unknown)  lactose (Other)    MEDICATIONS  (STANDING):  clotrimazole 1% Cream 1 Application(s) Topical two times a day  enoxaparin Injectable 30 milliGRAM(s) SubCutaneous daily  famotidine    Tablet 20 milliGRAM(s) Oral daily  furosemide   Injectable 20 milliGRAM(s) IV Push two times a day  latanoprost 0.005% Ophthalmic Solution 1 Drop(s) Right EYE at bedtime  metoprolol tartrate 25 milliGRAM(s) Oral every 6 hours  timolol 0.5% Solution 1 Drop(s) Right EYE daily  trimethoprim  160 mG/sulfamethoxazole 800 mG 1 Tablet(s) Oral two times a day    MEDICATIONS  (PRN):  aluminum hydroxide/magnesium hydroxide/simethicone Suspension 30 milliLiter(s) Oral every 6 hours PRN Dyspepsia      Vital Signs Last 24 Hrs  T(C): 36.3 (16 Oct 2019 08:14), Max: 36.9 (15 Oct 2019 16:09)  T(F): 97.4 (16 Oct 2019 08:14), Max: 98.5 (15 Oct 2019 20:04)  HR: 76 (16 Oct 2019 08:14) (76 - 115)  BP: 129/65 (16 Oct 2019 08:14) (91/55 - 129/65)  BP(mean): --  RR: 18 (16 Oct 2019 08:14) (18 - 18)  SpO2: 98% (16 Oct 2019 08:14) (97% - 99%)  ICU Vital Signs Last 24 Hrs    PHYSICAL EXAM:  General: Appears well developed, well nourished alert and cooperative.  HEENT: Head; normocephalic, atraumatic.Pupils reactive, cornea wnl. Neck supple, no nodes adenopathy, no JVD  CARDIOVASCULAR: Normal S1 and S2, 1/6 MARGARITA, no rub, gallop or lift.   LUNGS: No rales, rhonchi or wheeze. Normal breath sounds bilaterally.  ABDOMEN: Soft, nontender without mass or organomegaly. bowel sounds normoactive.  EXTREMITIES: Unna boot in place with malodorous serous drainage   SKIN: warm and dry with normal turgor.  NEURO: Alert/oriented x 3/normal motor exam.   PSYCH: normal affect.        LABS:                        9.7    12.99 )-----------( 190      ( 16 Oct 2019 06:43 )             30.2     10-16    140  |  100  |  22.0<H>  ----------------------------<  96  3.5   |  26.0  |  1.25    Ca    8.2<L>      16 Oct 2019 06:43  Phos  4.0     10-15  Mg     1.7     10-16    TPro  6.2<L>  /  Alb  2.3<L>  /  TBili  <0.2<L>  /  DBili  x   /  AST  16  /  ALT  9   /  AlkPhos  78  10-16    ANN QUIÑONES  CARDIAC MARKERS ( 15 Oct 2019 00:46 )  x     / <0.01 ng/mL / x     / x     / x      CARDIAC MARKERS ( 14 Oct 2019 16:36 )  x     / <0.01 ng/mL / x     / x     / x          PT/INR - ( 14 Oct 2019 16:36 )   PT: 12.1 sec;   INR: 1.05 ratio         PTT - ( 14 Oct 2019 16:36 )  PTT:28.8 sec      RADIOLOGY & ADDITIONAL STUDIES:    ASSESSMENT AND PLAN:  Ms Quiñones is an 84 y.o. female with a history of hypertension, osteoarthritis, chronic HFpEF and lymphedema which has been complicated by celullitis for which she was admitted 8/26/19 who recently presented to vascular surgery 10/9/19 with recurrent severe stasis changes with weeping wounds who is now referred to the ER by her visiting nurse for the management of tachycardia found to have new onset atrial fibrillation.    pAF  [id number] >2  - poor systemic AC candidate given chronic wounds and recurrent falls  - metoprolol XL 25mg daily    Bilateral LE edema due to lymphedema   - continue lasix 40mg IV Q12   - no evidence of acute infection at this time  - vascular eval for Unna boot management appreciated     Thank you for allowing [facility] to participate in the care of this patient.  Please feel free to call with any questions or concerns.

## 2019-10-16 NOTE — PROGRESS NOTE ADULT - SUBJECTIVE AND OBJECTIVE BOX
Pt seen and examined.  Chronic lymphedema, venous insuff.  Unna boots in place.  Suggest Unna boot change 3x/weekly.  Outpt f/u.

## 2019-10-16 NOTE — PROGRESS NOTE ADULT - SUBJECTIVE AND OBJECTIVE BOX
ANN HENAO  ----------------------------------------  The patient was seen and evaluated for atrial fibrillation.  The patient is in no acute distress.  Denied any chest pain, palpitations, dyspnea, or abdominal pain.  Offers no complaints.    Vital Signs Last 24 Hrs  T(C): 37 (16 Oct 2019 13:08), Max: 37 (16 Oct 2019 13:08)  T(F): 98.6 (16 Oct 2019 13:08), Max: 98.6 (16 Oct 2019 13:08)  HR: 89 (16 Oct 2019 13:08) (76 - 115)  BP: 103/65 (16 Oct 2019 13:08) (91/55 - 129/65)  BP(mean): --  RR: 18 (16 Oct 2019 08:14) (18 - 18)  SpO2: 98% (16 Oct 2019 13:08) (97% - 99%)    PHYSICAL EXAMINATION:  ----------------------------------------  General appearance: No acute distress, Awake, Alert  HEENT: Normocephalic, Atraumatic, Conjunctiva clear, EOMI  Neck: Supple, No JVD, No tenderness  Lungs: Breath sound equal bilaterally, No wheezes, No rales  Cardiovascular: S1S2, Irregular rhythm  Abdomen: Soft, Nontender, Nondistended, No guarding/rebound, Positive bowel sounds  Extremities: No clubbing, No cyanosis, Lower extremity edema, Dressings in place, Malodorous  Neuro: Strength equal bilaterally, No tremors  Psychiatric: Appropriate mood, Normal affect    LABORATORY STUDIES:  ----------------------------------------             9.7    12.99 )-----------( 190      ( 16 Oct 2019 06:43 )             30.2     10-16    140  |  100  |  22.0<H>  ----------------------------<  96  3.5   |  26.0  |  1.25    Ca    8.2<L>      16 Oct 2019 06:43  Phos  4.0     10-15  Mg     1.7     10-16    TPro  6.2<L>  /  Alb  2.3<L>  /  TBili  <0.2<L>  /  DBili  x   /  AST  16  /  ALT  9   /  AlkPhos  78  10-16    LIVER FUNCTIONS - ( 16 Oct 2019 06:43 )  Alb: 2.3 g/dL / Pro: 6.2 g/dL / ALK PHOS: 78 U/L / ALT: 9 U/L / AST: 16 U/L / GGT: x           PT/INR - ( 14 Oct 2019 16:36 )   PT: 12.1 sec;   INR: 1.05 ratio    PTT - ( 14 Oct 2019 16:36 )  PTT:28.8 sec    CARDIAC MARKERS ( 15 Oct 2019 00:46 )  x     / <0.01 ng/mL / x     / x     / x      CARDIAC MARKERS ( 14 Oct 2019 16:36 )  x     / <0.01 ng/mL / x     / x     / x        MEDICATIONS  (STANDING):  clotrimazole 1% Cream 1 Application(s) Topical two times a day  enoxaparin Injectable 30 milliGRAM(s) SubCutaneous daily  famotidine    Tablet 20 milliGRAM(s) Oral daily  furosemide   Injectable 20 milliGRAM(s) IV Push two times a day  latanoprost 0.005% Ophthalmic Solution 1 Drop(s) Right EYE at bedtime  metoprolol tartrate 25 milliGRAM(s) Oral every 6 hours  timolol 0.5% Solution 1 Drop(s) Right EYE daily  trimethoprim  160 mG/sulfamethoxazole 800 mG 1 Tablet(s) Oral two times a day    MEDICATIONS  (PRN):  aluminum hydroxide/magnesium hydroxide/simethicone Suspension 30 milliLiter(s) Oral every 6 hours PRN Dyspepsia      ASSESSMENT / PLAN:  ----------------------------------------  84F with a history of lymphedema who was referred to the hospital by her home nurse for atrial fibrillation. The patient was seen by Cardiology and metoprolol were initiated.    Atrial fibrillation - Cardiology consultation noted. On metoprolol for rate control. Thought to be a poor candidate for anticoagulation due to chronic leg wounds and fall risk.    Lymphedema / Chronic leg wounds - Vascular Surgery consultation noted. To continue with Unna boots.    Glaucoma - On timolol eye drops.    Fall - The patient was reported to have had an unwitnessed fall overnight. CT of the head was without acute intracranial pathology. Advised to ask for assistance when ambulating.

## 2019-10-17 PROCEDURE — 99232 SBSQ HOSP IP/OBS MODERATE 35: CPT

## 2019-10-17 RX ADMIN — Medication 25 MILLIGRAM(S): at 17:18

## 2019-10-17 RX ADMIN — FAMOTIDINE 20 MILLIGRAM(S): 10 INJECTION INTRAVENOUS at 17:15

## 2019-10-17 RX ADMIN — Medication 1 DROP(S): at 12:12

## 2019-10-17 RX ADMIN — ENOXAPARIN SODIUM 30 MILLIGRAM(S): 100 INJECTION SUBCUTANEOUS at 22:06

## 2019-10-17 RX ADMIN — Medication 1 APPLICATION(S): at 17:15

## 2019-10-17 RX ADMIN — Medication 20 MILLIGRAM(S): at 17:18

## 2019-10-17 RX ADMIN — LATANOPROST 1 DROP(S): 0.05 SOLUTION/ DROPS OPHTHALMIC; TOPICAL at 22:07

## 2019-10-17 RX ADMIN — Medication 20 MILLIGRAM(S): at 05:14

## 2019-10-17 RX ADMIN — Medication 1 APPLICATION(S): at 05:14

## 2019-10-17 RX ADMIN — Medication 25 MILLIGRAM(S): at 23:58

## 2019-10-17 RX ADMIN — Medication 25 MILLIGRAM(S): at 12:17

## 2019-10-17 NOTE — PROGRESS NOTE ADULT - ASSESSMENT
1. Elderly F admitted with rapid and paroxysmal afib-now in SR with frequent APC's on higher dose lopressor.  2. lymphedema  echo today to eval LV and valves.

## 2019-10-17 NOTE — DIETITIAN INITIAL EVALUATION ADULT. - PROBLEM SELECTOR PLAN 1
BP dropped to 92/42. with reduce the lasix dose to 20 mg iv twice a a day. metoprolol 25 mg po q 6 hrs. [facility]

## 2019-10-17 NOTE — DIETITIAN INITIAL EVALUATION ADULT. - PERTINENT LABORATORY DATA
10-16 Na140 mmol/L Glu 96 mg/dL K+ 3.5 mmol/L Cr  1.25 mg/dL BUN 22.0 mg/dL<H> Phos n/a   Alb 2.3 g/dL<L> PAB n/a

## 2019-10-17 NOTE — DIETITIAN INITIAL EVALUATION ADULT. - ETIOLOGY
related to inadequate energy intake in setting of advanced age and CHF related to inability to consume sufficient protein energy intake with inconsistent po intake in setting of advanced age and confusion at times

## 2019-10-17 NOTE — DIETITIAN INITIAL EVALUATION ADULT. - FLUIDS
1. I was told the name of the physician that took care of my child while in the hospital.    2. I have been told about any important findings on my child's physical exam and my child's plan of care.    3. The doctor clearly explained my child's diagnosis and other possible diagnoses that were considered.    4. My child's doctor explained all the tests that were done and their results (if available). I understand that some of the test results may not be ready before we go home and I was told how I can get these results. I understand that a summary of my child's hospitalization and important test results will be shared with my child's outpatient doctor.    5. My child's doctor talked to me about what I need to do when we go home.    6. I understand what signs and symptoms to watch for. I understand what symptoms I would need to call my doctor for and/or return to the hospital.    7. I have the phone number to call the hospital for results and/or questions after I leave the hospital.
[id number]

## 2019-10-17 NOTE — DIETITIAN INITIAL EVALUATION ADULT. - SIGNS/SYMPTOMS
as evidenced by likely meeting <75% estimated needs >1 mo, mod muscle/fat loss, 1+ chronic edema as evidenced by mod/severe muscle wasting, mod fat loss, likely meeting <75% est energy intake >1 mo

## 2019-10-17 NOTE — DIETITIAN INITIAL EVALUATION ADULT. - NUTRITIONGOAL OUTCOME1
Pt will meet >75% estimated needs Pt will meet >75% estimated nutrition needs, consume supplement 1-3 times daily, prevent wt loss

## 2019-10-17 NOTE — DIETITIAN INITIAL EVALUATION ADULT. - MALNUTRITION
NFPE: moderate muscle wasting in temples and shoulders, severe muscle wasting in clavicles, moderate fat loss in buccals, temples and triceps Severe chronic NFPE: severe muscle wasting in clavicles.  Moderate muscle wasting at temples, shoulders.  Moderate fat loss in orbital region, buccal pads, and triceps. Moderate (chronic)

## 2019-10-17 NOTE — DIETITIAN INITIAL EVALUATION ADULT. - OTHER INFO
85 y/o female with h/o CHF, chronic lower ext lymph edema, Glaucoma, was referred to the ER by home visiting nurse for a new onset. A Fib. Spoke with pt at bedside, pt poor historian, appeared confused. Pt states  lbs, but cannot recall last time weigh this amount, unsure of wt loss. Pt states good appetite, although as per diet recall pt is likely meeting <75% estimated needs >1 mo. Pt states she does not follow a particular diet and just eats whatever is in the house. Pt stated she would try gelatein, declined Ensure. Pt noted with no teeth, declined chewing/ swallowing difficulties. NFPE conducted pt noted with moderate muscle wasting and fat loss. 83 y/o female with h/o CHF, chronic lower ext lymph edema, Glaucoma, was referred to the ER by home visiting nurse for a new onset. VIKTOR Fib. Spoke with pt at bedside, pt poor historian, appeared confused at times. Pt states  lbs, but cannot recall last time she weighed this amount, unsure of wt loss. Pt states good appetite, although as per diet recall pt is likely meeting <75% estimated needs. Pt states not following meal plan at home. Pt agreeable to try Gelatein supplement, declined Ensure at this time. Pt noted with poor dentition, denied chewing difficulty.

## 2019-10-17 NOTE — PROGRESS NOTE ADULT - SUBJECTIVE AND OBJECTIVE BOX
Chief Complaint: chart and course reviewed.    HPI: 83 yo F admitted with rapid paroxysmal atrial fib. She has chronic lymphedema.    PAST MEDICAL & SURGICAL HISTORY:  Edema  Colitis  Hypertension  Hyperlipemia  Glaucoma  Cataract of left eye      PREVIOUS DIAGNOSTIC TESTING:      ECHO  FINDINGS: scheduled for today.    STRESS  FINDINGS:    CATHETERIZATION  FINDINGS:    MEDICATIONS  (STANDING):  clotrimazole 1% Cream 1 Application(s) Topical two times a day  enoxaparin Injectable 30 milliGRAM(s) SubCutaneous daily  famotidine    Tablet 20 milliGRAM(s) Oral daily  furosemide   Injectable 20 milliGRAM(s) IV Push two times a day  latanoprost 0.005% Ophthalmic Solution 1 Drop(s) Right EYE at bedtime  metoprolol tartrate 25 milliGRAM(s) Oral every 6 hours  timolol 0.5% Solution 1 Drop(s) Right EYE daily    MEDICATIONS  (PRN):  aluminum hydroxide/magnesium hydroxide/simethicone Suspension 30 milliLiter(s) Oral every 6 hours PRN Dyspepsia      FAMILY HISTORY:      ROS: Negative other than as mentioned in HPI.    Vital Signs Last 24 Hrs  T(C): 36.4 (17 Oct 2019 05:13), Max: 37 (16 Oct 2019 13:08)  T(F): 97.6 (17 Oct 2019 05:13), Max: 98.6 (16 Oct 2019 13:08)  HR: 90 with irreg. (17 Oct 2019 06:55) (68 - 123)  BP: 105/70 la manually (17 Oct 2019 06:55) (99/62 - 126/73)  BP(mean): --  RR: 14 flat (17 Oct 2019 05:13) (18 - 19)  SpO2: 100% (17 Oct 2019 05:13) (96% - 100%)    PHYSICAL EXAM:  General: Appears well developed, well nourished alert and cooperative. elderly afebrile frail F nad.  HEENT: Head; normocephalic, atraumatic.  Eyes;   Pupils reactive, cornea wnl.  Neck; Supple, no nodes adenopathy, no NVD or carotid bruit or thyromegaly.  CARDIOVASCULAR; No murmur, rub, gallop or lift. Normal S1 and S2.  LUNGS; No rales, rhonchi or wheeze. Normal breath sounds bilaterally.  ABDOMEN ; Soft, nontender without mass or organomegaly. bowel sounds normoactive.  EXTREMITIES; legs are wrapped w compression bandages  SKIN; warm and dry with normal turgor.  NEURO; Alert/oriented x 3/normal motor exam.    PSYCH; normal affect.            INTERPRETATION OF TELEMETRY:    ECG: monitor strips reviewed. Pt in SR with frequent APC's    I&O's Detail    16 Oct 2019 07:01  -  17 Oct 2019 07:00  --------------------------------------------------------  IN:  Total IN: 0 mL    OUT:    Voided: 300 mL  Total OUT: 300 mL    Total NET: -300 mL          LABS:                        9.7    12.99 )-----------( 190      ( 16 Oct 2019 06:43 )             30.2     10-16    140  |  100  |  22.0<H>  ----------------------------<  96  3.5   |  26.0  |  1.25    Ca    8.2<L>      16 Oct 2019 06:43  Mg     1.7     10-16    TPro  6.2<L>  /  Alb  2.3<L>  /  TBili  <0.2<L>  /  DBili  x   /  AST  16  /  ALT  9   /  AlkPhos  78  10-16            I&O's Summary    16 Oct 2019 07:01  -  17 Oct 2019 07:00  --------------------------------------------------------  IN: 0 mL / OUT: 300 mL / NET: -300 mL        RADIOLOGY & ADDITIONAL STUDIES:

## 2019-10-17 NOTE — CHART NOTE - NSCHARTNOTEFT_GEN_A_CORE
Upon Nutritional Assessment by the Registered Dietitian your patient was determined to meet criteria / has evidence of the following diagnosis/diagnoses:            [x ]  Moderate Protein Calorie Malnutrition          Findings as based on:  •  Comprehensive nutrition assessment and consultation  •  Calorie counts (nutrient intake analysis)  •  Food acceptance and intake status from observations by staff  •  Follow up  •  Patient education  •  Intervention secondary to interdisciplinary rounds  •   concerns      Treatment:    The following diet has been recommended:  Will provide Gelatein TID (pt declined Ensure supplements)    PROVIDER Section:     By signing this assessment you are acknowledging and agree with the diagnosis/diagnoses assigned by the Registered Dietitian    Comments:

## 2019-10-17 NOTE — PROGRESS NOTE ADULT - SUBJECTIVE AND OBJECTIVE BOX
ANN HENAO  ----------------------------------------  The patient was seen and evaluated for atrial fibrillation.  The patient is in no acute distress.  Denied any chest pain, palpitations, dyspnea, or abdominal pain.    Vital Signs Last 24 Hrs  T(C): 36.4 (17 Oct 2019 12:18), Max: 37 (16 Oct 2019 13:08)  T(F): 97.6 (17 Oct 2019 12:18), Max: 98.6 (16 Oct 2019 13:08)  HR: 104 (17 Oct 2019 12:18) (68 - 123)  BP: 98/47 (17 Oct 2019 12:18) (98/47 - 126/73)  BP(mean): --  RR: 18 (17 Oct 2019 12:18) (18 - 19)  SpO2: 100% (17 Oct 2019 05:13) (96% - 100%)    PHYSICAL EXAMINATION:  ----------------------------------------  General appearance: No acute distress, Awake, Alert  HEENT: Normocephalic, Atraumatic, Conjunctiva clear, EOMI  Neck: Supple, No JVD, No tenderness  Lungs: Breath sound equal bilaterally, No wheezes, No rales  Cardiovascular: S1S2, Irregular rhythm  Abdomen: Soft, Nontender, Nondistended, No guarding/rebound, Positive bowel sounds  Extremities: No clubbing, No cyanosis, Lower extremity edema, Dressings in place  Neuro: Strength equal bilaterally, No tremors  Psychiatric: Appropriate mood, Normal affect    LABORATORY STUDIES:  ----------------------------------------             9.7    12.99 )-----------( 190      ( 16 Oct 2019 06:43 )             30.2     10-16    140  |  100  |  22.0<H>  ----------------------------<  96  3.5   |  26.0  |  1.25    Ca    8.2<L>      16 Oct 2019 06:43  Mg     1.7     10-16    TPro  6.2<L>  /  Alb  2.3<L>  /  TBili  <0.2<L>  /  DBili  x   /  AST  16  /  ALT  9   /  AlkPhos  78  10-16    LIVER FUNCTIONS - ( 16 Oct 2019 06:43 )  Alb: 2.3 g/dL / Pro: 6.2 g/dL / ALK PHOS: 78 U/L / ALT: 9 U/L / AST: 16 U/L / GGT: x           MEDICATIONS  (STANDING):  clotrimazole 1% Cream 1 Application(s) Topical two times a day  enoxaparin Injectable 30 milliGRAM(s) SubCutaneous daily  famotidine    Tablet 20 milliGRAM(s) Oral daily  furosemide   Injectable 20 milliGRAM(s) IV Push two times a day  latanoprost 0.005% Ophthalmic Solution 1 Drop(s) Right EYE at bedtime  metoprolol tartrate 25 milliGRAM(s) Oral every 6 hours  timolol 0.5% Solution 1 Drop(s) Right EYE daily    MEDICATIONS  (PRN):  aluminum hydroxide/magnesium hydroxide/simethicone Suspension 30 milliLiter(s) Oral every 6 hours PRN Dyspepsia      ASSESSMENT / PLAN:  ----------------------------------------  84F with a history of lymphedema who was referred to the hospital by her home nurse for atrial fibrillation. The patient was seen by Cardiology and metoprolol was initiated with improvement in the heart rate.    Atrial fibrillation - Cardiology follow up noted. On metoprolol for rate control. Thought to be a poor candidate for anticoagulation due to chronic leg wounds and fall risk. Awaiting echocardiogram.    Lymphedema / Chronic leg wounds - Vascular Surgery consultation noted. To continue with Unna boots.    Glaucoma - On timolol eye drops.    Fall - Reported to have had an unwitnessed fall in the emergency department. CT of the head was without acute intracranial pathology. The aptient also reported episodes of falling at home despite the use of her walker. Physical Therapy evaluation pending.

## 2019-10-18 ENCOUNTER — TRANSCRIPTION ENCOUNTER (OUTPATIENT)
Age: 84
End: 2019-10-18

## 2019-10-18 VITALS
RESPIRATION RATE: 18 BRPM | HEART RATE: 102 BPM | TEMPERATURE: 98 F | DIASTOLIC BLOOD PRESSURE: 58 MMHG | OXYGEN SATURATION: 98 % | SYSTOLIC BLOOD PRESSURE: 95 MMHG

## 2019-10-18 LAB
ANION GAP SERPL CALC-SCNC: 13 MMOL/L — SIGNIFICANT CHANGE UP (ref 5–17)
BUN SERPL-MCNC: 21 MG/DL — HIGH (ref 8–20)
CALCIUM SERPL-MCNC: 8.3 MG/DL — LOW (ref 8.6–10.2)
CHLORIDE SERPL-SCNC: 100 MMOL/L — SIGNIFICANT CHANGE UP (ref 98–107)
CO2 SERPL-SCNC: 28 MMOL/L — SIGNIFICANT CHANGE UP (ref 22–29)
CREAT SERPL-MCNC: 1.09 MG/DL — SIGNIFICANT CHANGE UP (ref 0.5–1.3)
GLUCOSE SERPL-MCNC: 84 MG/DL — SIGNIFICANT CHANGE UP (ref 70–115)
HCT VFR BLD CALC: 32.8 % — LOW (ref 34.5–45)
HGB BLD-MCNC: 10.2 G/DL — LOW (ref 11.5–15.5)
MAGNESIUM SERPL-MCNC: 1.9 MG/DL — SIGNIFICANT CHANGE UP (ref 1.6–2.6)
MCHC RBC-ENTMCNC: 25.9 PG — LOW (ref 27–34)
MCHC RBC-ENTMCNC: 31.1 GM/DL — LOW (ref 32–36)
MCV RBC AUTO: 83.2 FL — SIGNIFICANT CHANGE UP (ref 80–100)
PLATELET # BLD AUTO: 160 K/UL — SIGNIFICANT CHANGE UP (ref 150–400)
POTASSIUM SERPL-MCNC: 3.1 MMOL/L — LOW (ref 3.5–5.3)
POTASSIUM SERPL-SCNC: 3.1 MMOL/L — LOW (ref 3.5–5.3)
RBC # BLD: 3.94 M/UL — SIGNIFICANT CHANGE UP (ref 3.8–5.2)
RBC # FLD: 16.8 % — HIGH (ref 10.3–14.5)
SODIUM SERPL-SCNC: 141 MMOL/L — SIGNIFICANT CHANGE UP (ref 135–145)
WBC # BLD: 10.48 K/UL — SIGNIFICANT CHANGE UP (ref 3.8–10.5)
WBC # FLD AUTO: 10.48 K/UL — SIGNIFICANT CHANGE UP (ref 3.8–10.5)

## 2019-10-18 PROCEDURE — 70450 CT HEAD/BRAIN W/O DYE: CPT

## 2019-10-18 PROCEDURE — 83880 ASSAY OF NATRIURETIC PEPTIDE: CPT

## 2019-10-18 PROCEDURE — 36415 COLL VENOUS BLD VENIPUNCTURE: CPT

## 2019-10-18 PROCEDURE — 96374 THER/PROPH/DIAG INJ IV PUSH: CPT

## 2019-10-18 PROCEDURE — 85610 PROTHROMBIN TIME: CPT

## 2019-10-18 PROCEDURE — 80053 COMPREHEN METABOLIC PANEL: CPT

## 2019-10-18 PROCEDURE — 93005 ELECTROCARDIOGRAM TRACING: CPT

## 2019-10-18 PROCEDURE — 71045 X-RAY EXAM CHEST 1 VIEW: CPT

## 2019-10-18 PROCEDURE — 80048 BASIC METABOLIC PNL TOTAL CA: CPT

## 2019-10-18 PROCEDURE — 96375 TX/PRO/DX INJ NEW DRUG ADDON: CPT

## 2019-10-18 PROCEDURE — 97163 PT EVAL HIGH COMPLEX 45 MIN: CPT

## 2019-10-18 PROCEDURE — 86703 HIV-1/HIV-2 1 RESULT ANTBDY: CPT

## 2019-10-18 PROCEDURE — 99239 HOSP IP/OBS DSCHRG MGMT >30: CPT

## 2019-10-18 PROCEDURE — 99285 EMERGENCY DEPT VISIT HI MDM: CPT | Mod: 25

## 2019-10-18 PROCEDURE — 85027 COMPLETE CBC AUTOMATED: CPT

## 2019-10-18 PROCEDURE — 85730 THROMBOPLASTIN TIME PARTIAL: CPT

## 2019-10-18 PROCEDURE — 84484 ASSAY OF TROPONIN QUANT: CPT

## 2019-10-18 PROCEDURE — 93010 ELECTROCARDIOGRAM REPORT: CPT

## 2019-10-18 PROCEDURE — 84100 ASSAY OF PHOSPHORUS: CPT

## 2019-10-18 PROCEDURE — 83735 ASSAY OF MAGNESIUM: CPT

## 2019-10-18 PROCEDURE — C8929: CPT

## 2019-10-18 PROCEDURE — 96376 TX/PRO/DX INJ SAME DRUG ADON: CPT

## 2019-10-18 PROCEDURE — G0378: CPT

## 2019-10-18 RX ORDER — FAMOTIDINE 10 MG/ML
1 INJECTION INTRAVENOUS
Qty: 0 | Refills: 0 | DISCHARGE
Start: 2019-10-18

## 2019-10-18 RX ORDER — METOPROLOL TARTRATE 50 MG
1 TABLET ORAL
Qty: 0 | Refills: 0 | DISCHARGE
Start: 2019-10-18

## 2019-10-18 RX ORDER — POTASSIUM CHLORIDE 20 MEQ
40 PACKET (EA) ORAL ONCE
Refills: 0 | Status: COMPLETED | OUTPATIENT
Start: 2019-10-18 | End: 2019-10-18

## 2019-10-18 RX ADMIN — Medication 1 DROP(S): at 13:11

## 2019-10-18 RX ADMIN — Medication 25 MILLIGRAM(S): at 13:11

## 2019-10-18 RX ADMIN — Medication 1 APPLICATION(S): at 06:01

## 2019-10-18 RX ADMIN — FAMOTIDINE 20 MILLIGRAM(S): 10 INJECTION INTRAVENOUS at 13:11

## 2019-10-18 RX ADMIN — Medication 1 APPLICATION(S): at 17:01

## 2019-10-18 RX ADMIN — Medication 20 MILLIGRAM(S): at 06:01

## 2019-10-18 RX ADMIN — Medication 40 MILLIEQUIVALENT(S): at 14:44

## 2019-10-18 NOTE — ED CDU PROVIDER SUBSEQUENT DAY NOTE - ATTENDING CONTRIBUTION TO CARE
I, Darrell Sanz, performed the initial face to face bedside interview with this patient regarding history of present illness, review of symptoms and relevant past medical, social and family history.  I completed an independent physical examination.  I was the initial provider who evaluated this patient. I have signed out the follow up of any pending tests (i.e. labs, radiological studies) to the ACP.  I have communicated the patient’s plan of care and disposition with the ACP.

## 2019-10-18 NOTE — PHYSICAL THERAPY INITIAL EVALUATION ADULT - GAIT DEVIATIONS NOTED, PT EVAL
increased time in double stance/decreased stride length/decreased velocity of limb motion/shuffling/decreased laila/decreased step length

## 2019-10-18 NOTE — PHYSICAL THERAPY INITIAL EVALUATION ADULT - ADDITIONAL COMMENTS
Pt lives in an apartment with no stairs.   Pt owns medical equipment: RW  Pt lives with alone. Pt was ambulating with RW independently PTA, short distances in house and outside.

## 2019-10-18 NOTE — DISCHARGE NOTE PROVIDER - PROVIDER TOKENS
PROVIDER:[TOKEN:[[id number]],FOLLOWUP:[2 weeks]],PROVIDER:[TOKEN:[[id number]],FOLLOWUP:[1 week]],PROVIDER:[TOKEN:[[id number]],FOLLOWUP:[2 weeks]]

## 2019-10-18 NOTE — DISCHARGE NOTE NURSING/CASE MANAGEMENT/SOCIAL WORK - PATIENT PORTAL LINK FT
You can access the FollowMyHealth Patient Portal offered by [facility] by registering at the following website: [email or web address]. By joining [employer]’s FollowMyHealth portal, you will also be able to view your health information using other applications (apps) compatible with our system.

## 2019-10-18 NOTE — DISCHARGE NOTE PROVIDER - HOSPITAL COURSE
84F with a prior admission for angioedema referred to the hospital by her visiting nurse for atrial fibrillation. The patient reported a history of intermittent dyspnea over the past few weeks. WBC(11.42), (10.6/32.4), Trop(0.01), BNP(1214). Diltiazem was administered in the emergency department with conversion to sinus rhythm but later returned to atrial fibrillation. The patient was seen by Cardiology in consultation for atrial fibrillation and thought to be a poor candidate for anticoagulation due to chronic wounds and history of falls. The patient was seen by Vascular Surgery in consultation for candida dermatitis with superimposed bacterial infection with recommendations continue with the Unna boot. The patient had an unwitnessed fall overnight and CT of the head noted no acute intra-axial or extra-axial hemorrhage, no mass effect or shift of the midline, minimal chronic ischemic changes are seen in the frontoparietal white matter, no acute vascular territorial infarct. The patient had improvement in the tachycardia and was seen by Physical Therapy for evaluation with recommendations to pursue further treatment at a rehabilitation facility.            35 minutes total time

## 2019-10-18 NOTE — PROGRESS NOTE ADULT - SUBJECTIVE AND OBJECTIVE BOX
ANN HENAO  ----------------------------------------  The patient was seen and evaluated for atrial fibrillation.  The patient is in no acute distress.  Denied any chest pain, palpitations, dyspnea, or abdominal pain.  Offers no complaints.    Vital Signs Last 24 Hrs  T(C): 36.8 (18 Oct 2019 10:33), Max: 36.8 (17 Oct 2019 22:05)  T(F): 98.2 (18 Oct 2019 10:33), Max: 98.3 (18 Oct 2019 05:55)  HR: 110 (18 Oct 2019 13:10) (83 - 110)  BP: 106/67 (18 Oct 2019 13:10) (94/52 - 106/67)  BP(mean): --  RR: 18 (18 Oct 2019 10:33) (18 - 18)  SpO2: 98% (18 Oct 2019 10:33) (96% - 100%)    PHYSICAL EXAMINATION:  ----------------------------------------  General appearance: No acute distress, Awake, Alert  HEENT: Normocephalic, Atraumatic, Conjunctiva clear, EOMI  Neck: Supple, No JVD, No tenderness  Lungs: Breath sound equal bilaterally, No wheezes, No rales  Cardiovascular: S1S2, Irregular rhythm  Abdomen: Soft, Nontender, Nondistended, No guarding/rebound, Positive bowel sounds  Extremities: No clubbing, No cyanosis, Lower extremity edema, Dressings in place  Neuro: Strength equal bilaterally, No tremors  Psychiatric: Appropriate mood, Normal affect    LABORATORY STUDIES:  ----------------------------------------             10.2   10.48 )-----------( 160      ( 18 Oct 2019 06:27 )             32.8     10-18    141  |  100  |  21.0<H>  ----------------------------<  84  3.1<L>   |  28.0  |  1.09    Ca    8.3<L>      18 Oct 2019 06:27  Mg     1.9     10-18    MEDICATIONS  (STANDING):  clotrimazole 1% Cream 1 Application(s) Topical two times a day  enoxaparin Injectable 30 milliGRAM(s) SubCutaneous daily  famotidine    Tablet 20 milliGRAM(s) Oral daily  furosemide   Injectable 20 milliGRAM(s) IV Push two times a day  latanoprost 0.005% Ophthalmic Solution 1 Drop(s) Right EYE at bedtime  metoprolol tartrate 25 milliGRAM(s) Oral every 6 hours  potassium chloride    Tablet ER 40 milliEquivalent(s) Oral once  timolol 0.5% Solution 1 Drop(s) Right EYE daily    MEDICATIONS  (PRN):  aluminum hydroxide/magnesium hydroxide/simethicone Suspension 30 milliLiter(s) Oral every 6 hours PRN Dyspepsia      ASSESSMENT / PLAN:  ----------------------------------------  84F with a history of lymphedema who was referred to the hospital by her home nurse for atrial fibrillation. The patient was seen by Cardiology and metoprolol was initiated with improvement in the heart rate.    Atrial fibrillation - Echocardiogram results noted. On metoprolol for rate control. Thought to be a poor candidate for anticoagulation due to chronic leg wounds and fall risk.    Lymphedema / Chronic leg wounds - Vascular Surgery consultation noted. To continue with Unna boots.    Glaucoma - On timolol eye drops.    Fall - Reported to have had an unwitnessed fall in the emergency department. CT of the head was without acute intracranial pathology. The aptient also reported episodes of falling at home despite the use of her walker. Physical Therapy evaluation noted with recommendations to pursue further treatment at a rehabilitation facility, the patient is agreeable.

## 2019-10-18 NOTE — PHYSICAL THERAPY INITIAL EVALUATION ADULT - GAIT TRAINING, PT EVAL
Time Frame: 14 days   Goal: Pt will ambulate 100 feet or greater with RW with modified independence.

## 2019-10-18 NOTE — DISCHARGE NOTE PROVIDER - CARE PROVIDER_API CALL
Mahendra Sanchez)  Internal Medicine  [address] [place] [address]  Phone: [phone]  Fax: [phone]  Follow Up Time: 2 weeks    Arturo Hines)  Cardiovascular Disease; Critical Care Medicine; Internal Medicine  [address] [place] [address]  Phone: [phone]  Fax: [phone]  Follow Up Time: 1 week    Cameron Finch)  Surgery  [address] [place] [address]  Phone: [phone]  Fax: [phone]  Follow Up Time: 2 weeks

## 2019-10-18 NOTE — DISCHARGE NOTE PROVIDER - NSDCCPCAREPLAN_GEN_ALL_CORE_FT
PRINCIPAL DISCHARGE DIAGNOSIS  Diagnosis: Atrial fibrillation with rapid ventricular response  Assessment and Plan of Treatment: Follow up with Cardiology for further management. Monitor potassium levels and supplement as needed.      SECONDARY DISCHARGE DIAGNOSES  Diagnosis: Lymphedema  Assessment and Plan of Treatment: Follow up with Vascular Suergy and unna boot 3 times a week

## 2019-10-18 NOTE — PROGRESS NOTE ADULT - SUBJECTIVE AND OBJECTIVE BOX
[facility], THE HEART [facility]                                   [address] [place] [address]                                                      PHONE: [phone]                                                         FAX: [phone]  [email or web address]  ---------------------------------------------------------------------------------------------------------------------------------    Overnight events/patient complaints: Reports fatigue     INTERPRETATION OF TELEMETRY (personally reviewed): SVT, NSR with frequent PACs     ECG:  < from: 12 Lead ECG (10.15.19 @ 08:13) >  Atrial fibrillation with rapid ventricular response  Nonspecific T wave abnormality    ECHO: < from: TTE Echo w/Cont Complete (10.17.19 @ 10:41) >   1. Left ventricular ejection fraction, by visual estimation, is 65 to 70%.   2. Normal global left ventricular systolic function.   3. The mitral in-flow pattern reveals no discernable A-wave, therefore no comment on diastolic function can be made.   4. Mild to moderately enlarged left atrium.   5. Mild to moderate mitral annular calcification.   6. Sclerotic aortic valve with normal opening.   7. Mild aortic regurgitation.   8. No available studies for comparison (Prior TTE from 10/16/2006 is not available for review).      I&O's Summary    17 Oct 2019 07:01  -  18 Oct 2019 07:00  --------------------------------------------------------  IN: 240 mL / OUT: 1225 mL / NET: -985 mL      MEDICATIONS  (STANDING):  clotrimazole 1% Cream 1 Application(s) Topical two times a day  enoxaparin Injectable 30 milliGRAM(s) SubCutaneous daily  famotidine    Tablet 20 milliGRAM(s) Oral daily  furosemide   Injectable 20 milliGRAM(s) IV Push two times a day  latanoprost 0.005% Ophthalmic Solution 1 Drop(s) Right EYE at bedtime  metoprolol tartrate 25 milliGRAM(s) Oral every 6 hours  potassium chloride    Tablet ER 40 milliEquivalent(s) Oral once  timolol 0.5% Solution 1 Drop(s) Right EYE daily    MEDICATIONS  (PRN):  aluminum hydroxide/magnesium hydroxide/simethicone Suspension 30 milliLiter(s) Oral every 6 hours PRN Dyspepsia      Vital Signs Last 24 Hrs  T(C): 36.8 (18 Oct 2019 10:33), Max: 36.8 (17 Oct 2019 22:05)  T(F): 98.2 (18 Oct 2019 10:33), Max: 98.3 (18 Oct 2019 05:55)  HR: 110 (18 Oct 2019 13:10) (83 - 110)  BP: 106/67 (18 Oct 2019 13:10) (94/52 - 106/67)  BP(mean): --  RR: 18 (18 Oct 2019 10:33) (18 - 18)  SpO2: 98% (18 Oct 2019 10:33) (96% - 100%)  ICU Vital Signs Last 24 Hrs    PHYSICAL EXAM:  General: Appears well developed, well nourished alert and cooperative.  HEENT: Head; normocephalic, atraumatic.Pupils reactive, cornea wnl. Neck supple, no nodes adenopathy, no JVD  CARDIOVASCULAR: Normal S1 and S2, 2/6 MARGARITA, no rub, gallop or lift.   LUNGS: No rales, rhonchi or wheeze. Normal breath sounds bilaterally.  ABDOMEN: Soft, nontender without mass or organomegaly. bowel sounds normoactive.  EXTREMITIES: No clubbing, cyanosis, (+) LE edema with Unna boots in place, dystrophic toes   SKIN: warm and dry with normal turgor.  NEURO: Alert/oriented x 3/normal motor exam.   PSYCH: normal affect.        LABS:                        10.2   10.48 )-----------( 160      ( 18 Oct 2019 06:27 )             32.8     10-18    141  |  100  |  21.0<H>  ----------------------------<  84  3.1<L>   |  28.0  |  1.09    Ca    8.3<L>      18 Oct 2019 06:27  Mg     1.9     10-18      Assessment and Plan   Ms Quiñones is an 84 y.o. female with a history of hypertension, osteoarthritis, chronic HFpEF and lymphedema which has been complicated by cellulitis for which she was admitted 8/26/19 who recently presented to vascular surgery 10/9/19 with recurrent severe stasis changes with weeping wounds who is now referred to the ER by her visiting nurse for the management of tachycardia found to have new onset atrial fibrillation/atrial arrhythmia.    pAF/SVT   [id number] >2  - poor systemic AC candidate given chronic wounds and recurrent falls  - metoprolol 25mg Q6  - replete K     Bilateral LE edema due to lymphedema   - continue lasix 20mg IV Q12 and will likely de-escalate to PO tomorrow   - no evidence of acute infection at this time  - Unna boot and wound care   - extensive discussion re: Na restriction and avoidance of keeping legs in a dependent position     Thank you for allowing [facility] to participate in the care of this patient.  Please feel free to call with any questions or concerns.

## 2020-10-23 NOTE — DISCHARGE NOTE PROVIDER - NSDCHC_MEDRECSTATUS_GEN_ALL_CORE
Admission Reconciliation is Completed  Discharge Reconciliation is Not Complete Terbinafine Counseling: Patient counseling regarding adverse effects of terbinafine including but not limited to headache, diarrhea, rash, upset stomach, liver function test abnormalities, itching, taste/smell disturbance, nausea, abdominal pain, and flatulence.  There is a rare possibility of liver failure that can occur when taking terbinafine.  The patient understands that a baseline LFT and kidney function test may be required. The patient verbalized understanding of the proper use and possible adverse effects of terbinafine.  All of the patient's questions and concerns were addressed.

## 2020-12-07 ENCOUNTER — EMERGENCY (EMERGENCY)
Facility: HOSPITAL | Age: 85
LOS: 1 days | Discharge: HOME | End: 2020-12-07
Attending: EMERGENCY MEDICINE
Payer: MEDICARE

## 2020-12-07 VITALS
RESPIRATION RATE: 20 BRPM | SYSTOLIC BLOOD PRESSURE: 166 MMHG | TEMPERATURE: 98 F | OXYGEN SATURATION: 99 % | HEIGHT: 56 IN | DIASTOLIC BLOOD PRESSURE: 86 MMHG | WEIGHT: 138.01 LBS | HEART RATE: 85 BPM

## 2020-12-07 VITALS
HEART RATE: 75 BPM | DIASTOLIC BLOOD PRESSURE: 74 MMHG | OXYGEN SATURATION: 99 % | SYSTOLIC BLOOD PRESSURE: 149 MMHG | TEMPERATURE: 98 F | RESPIRATION RATE: 18 BRPM

## 2020-12-07 LAB
ALBUMIN SERPL ELPH-MCNC: 3.3 G/DL — SIGNIFICANT CHANGE UP (ref 3.3–5.2)
ALP SERPL-CCNC: 169 U/L — HIGH (ref 40–120)
ALT FLD-CCNC: 7 U/L — SIGNIFICANT CHANGE UP
ANION GAP SERPL CALC-SCNC: 9 MMOL/L — SIGNIFICANT CHANGE UP (ref 5–17)
APPEARANCE UR: CLEAR — SIGNIFICANT CHANGE UP
APTT BLD: 34.3 SEC — SIGNIFICANT CHANGE UP (ref 27.5–35.5)
AST SERPL-CCNC: 16 U/L — SIGNIFICANT CHANGE UP
BACTERIA # UR AUTO: NEGATIVE — SIGNIFICANT CHANGE UP
BASOPHILS # BLD AUTO: 0.03 K/UL — SIGNIFICANT CHANGE UP (ref 0–0.2)
BASOPHILS NFR BLD AUTO: 0.5 % — SIGNIFICANT CHANGE UP (ref 0–2)
BILIRUB SERPL-MCNC: 0.3 MG/DL — LOW (ref 0.4–2)
BILIRUB UR-MCNC: NEGATIVE — SIGNIFICANT CHANGE UP
BLD GP AB SCN SERPL QL: SIGNIFICANT CHANGE UP
BUN SERPL-MCNC: 18 MG/DL — SIGNIFICANT CHANGE UP (ref 8–20)
CALCIUM SERPL-MCNC: 9 MG/DL — SIGNIFICANT CHANGE UP (ref 8.6–10.2)
CHLORIDE SERPL-SCNC: 107 MMOL/L — SIGNIFICANT CHANGE UP (ref 98–107)
CO2 SERPL-SCNC: 24 MMOL/L — SIGNIFICANT CHANGE UP (ref 22–29)
COLOR SPEC: YELLOW — SIGNIFICANT CHANGE UP
CREAT SERPL-MCNC: 0.89 MG/DL — SIGNIFICANT CHANGE UP (ref 0.5–1.3)
DIFF PNL FLD: ABNORMAL
EOSINOPHIL # BLD AUTO: 0.06 K/UL — SIGNIFICANT CHANGE UP (ref 0–0.5)
EOSINOPHIL NFR BLD AUTO: 0.9 % — SIGNIFICANT CHANGE UP (ref 0–6)
EPI CELLS # UR: SIGNIFICANT CHANGE UP
GLUCOSE SERPL-MCNC: 94 MG/DL — SIGNIFICANT CHANGE UP (ref 70–99)
GLUCOSE UR QL: NEGATIVE MG/DL — SIGNIFICANT CHANGE UP
HCT VFR BLD CALC: 39 % — SIGNIFICANT CHANGE UP (ref 34.5–45)
HGB BLD-MCNC: 12.8 G/DL — SIGNIFICANT CHANGE UP (ref 11.5–15.5)
IMM GRANULOCYTES NFR BLD AUTO: 0.3 % — SIGNIFICANT CHANGE UP (ref 0–1.5)
INR BLD: 1.06 RATIO — SIGNIFICANT CHANGE UP (ref 0.88–1.16)
KETONES UR-MCNC: NEGATIVE — SIGNIFICANT CHANGE UP
LEUKOCYTE ESTERASE UR-ACNC: ABNORMAL
LYMPHOCYTES # BLD AUTO: 1.39 K/UL — SIGNIFICANT CHANGE UP (ref 1–3.3)
LYMPHOCYTES # BLD AUTO: 21.2 % — SIGNIFICANT CHANGE UP (ref 13–44)
MCHC RBC-ENTMCNC: 28.8 PG — SIGNIFICANT CHANGE UP (ref 27–34)
MCHC RBC-ENTMCNC: 32.8 GM/DL — SIGNIFICANT CHANGE UP (ref 32–36)
MCV RBC AUTO: 87.8 FL — SIGNIFICANT CHANGE UP (ref 80–100)
MONOCYTES # BLD AUTO: 0.58 K/UL — SIGNIFICANT CHANGE UP (ref 0–0.9)
MONOCYTES NFR BLD AUTO: 8.9 % — SIGNIFICANT CHANGE UP (ref 2–14)
NEUTROPHILS # BLD AUTO: 4.47 K/UL — SIGNIFICANT CHANGE UP (ref 1.8–7.4)
NEUTROPHILS NFR BLD AUTO: 68.2 % — SIGNIFICANT CHANGE UP (ref 43–77)
NITRITE UR-MCNC: NEGATIVE — SIGNIFICANT CHANGE UP
PH UR: 8 — SIGNIFICANT CHANGE UP (ref 5–8)
PLATELET # BLD AUTO: 261 K/UL — SIGNIFICANT CHANGE UP (ref 150–400)
POTASSIUM SERPL-MCNC: 4.2 MMOL/L — SIGNIFICANT CHANGE UP (ref 3.5–5.3)
POTASSIUM SERPL-SCNC: 4.2 MMOL/L — SIGNIFICANT CHANGE UP (ref 3.5–5.3)
PROT SERPL-MCNC: 6.6 G/DL — SIGNIFICANT CHANGE UP (ref 6.6–8.7)
PROT UR-MCNC: NEGATIVE MG/DL — SIGNIFICANT CHANGE UP
PROTHROM AB SERPL-ACNC: 12.3 SEC — SIGNIFICANT CHANGE UP (ref 10.6–13.6)
RBC # BLD: 4.44 M/UL — SIGNIFICANT CHANGE UP (ref 3.8–5.2)
RBC # FLD: 13.5 % — SIGNIFICANT CHANGE UP (ref 10.3–14.5)
RBC CASTS # UR COMP ASSIST: ABNORMAL /HPF (ref 0–4)
SODIUM SERPL-SCNC: 140 MMOL/L — SIGNIFICANT CHANGE UP (ref 135–145)
SP GR SPEC: 1.01 — SIGNIFICANT CHANGE UP (ref 1.01–1.02)
UROBILINOGEN FLD QL: NEGATIVE MG/DL — SIGNIFICANT CHANGE UP
WBC # BLD: 6.55 K/UL — SIGNIFICANT CHANGE UP (ref 3.8–10.5)
WBC # FLD AUTO: 6.55 K/UL — SIGNIFICANT CHANGE UP (ref 3.8–10.5)
WBC UR QL: SIGNIFICANT CHANGE UP

## 2020-12-07 PROCEDURE — 86901 BLOOD TYPING SEROLOGIC RH(D): CPT

## 2020-12-07 PROCEDURE — 99284 EMERGENCY DEPT VISIT MOD MDM: CPT

## 2020-12-07 PROCEDURE — 80053 COMPREHEN METABOLIC PANEL: CPT

## 2020-12-07 PROCEDURE — 86850 RBC ANTIBODY SCREEN: CPT

## 2020-12-07 PROCEDURE — 87086 URINE CULTURE/COLONY COUNT: CPT

## 2020-12-07 PROCEDURE — 87186 SC STD MICRODIL/AGAR DIL: CPT

## 2020-12-07 PROCEDURE — 85610 PROTHROMBIN TIME: CPT

## 2020-12-07 PROCEDURE — 36415 COLL VENOUS BLD VENIPUNCTURE: CPT

## 2020-12-07 PROCEDURE — 85730 THROMBOPLASTIN TIME PARTIAL: CPT

## 2020-12-07 PROCEDURE — 86900 BLOOD TYPING SEROLOGIC ABO: CPT

## 2020-12-07 PROCEDURE — 99283 EMERGENCY DEPT VISIT LOW MDM: CPT

## 2020-12-07 PROCEDURE — 85025 COMPLETE CBC W/AUTO DIFF WBC: CPT

## 2020-12-07 PROCEDURE — 81001 URINALYSIS AUTO W/SCOPE: CPT

## 2020-12-07 NOTE — ED PROVIDER NOTE - NSFOLLOWUPINSTRUCTIONS_ED_ALL_ED_FT
1. Follow up with a gastroenterologist within 2-3days for reevaluation.  2.  Return to the Emergency Department for worsening, progressive or any other concerning symptoms.       Rectal Bleeding    WHAT YOU NEED TO KNOW:    Rectal bleeding can be caused by constipation, hemorrhoids, or anal fissures. It may also be caused by polyps, tumors, or medical conditions, such as colitis or diverticulitis.    DISCHARGE INSTRUCTIONS:    Medicines:   •Pain medicine: You may be given medicine to take away or decrease pain. Do not wait until the pain is severe before you take your medicine.      •Iron supplement: Iron helps your body make more red blood cells.       •Steroids: This medicine decreases inflammation in your rectum. It may be applied as a cream, ointment, or lotion.      •Take your medicine as directed. Contact your healthcare provider if you think your medicine is not helping or if you have side effects. Tell him of her if you are allergic to any medicine. Keep a list of the medicines, vitamins, and herbs you take. Include the amounts, and when and why you take them. Bring the list or the pill bottles to follow-up visits. Carry your medicine list with you in case of an emergency.      Follow up with your healthcare provider as directed: Write down your questions so you remember to ask them during your visits.     Drink liquids as directed: Ask your healthcare provider how much liquid to drink each day and which liquids are best for you. This will help prevent dehydration and constipation.    Contact your healthcare provider if:   •You have a fever.      •Your rectal bleeding stopped for a time, but has started again.       •You have nausea.      •You have cold, sweaty, pale skin.      •You have changes in your bowel movements, such as diarrhea.      •You have questions or concerns about your condition or care.      Return to the emergency department if:   •You are breathing faster than usual.      •You are dizzy, lightheaded, or feel faint.      •You are confused or cannot think clearly.      •You urinate less than usual or not at all.      •Your rectal bleeding is constant or heavy.      •You have severe abdominal pain or cramping.

## 2020-12-07 NOTE — ED PROVIDER NOTE - CARE PROVIDER_API CALL
Mtich Osborne  GASTROENTEROLOGY  [address] [place] [address]  Phone: [phone]  Fax: [phone]  Follow Up Time: 1-3 Days

## 2020-12-07 NOTE — ED PROVIDER NOTE - PROGRESS NOTE DETAILS
CBC normal, no episodes of bleeding in ED, asymtpomatic. UA neg. Stable for dc home with GI f/u. Keely Arredondo DO

## 2020-12-07 NOTE — ED ADULT NURSE NOTE - OBJECTIVE STATEMENT
pt biba from home (pt lives alone in a studio apt) with c/o rectal bleeding that started this morning when pt got up to use the bathroom. pt initially thought it was vaginal bleeding but after exam by md it was found to be rectal. denies any c/o abd pain, n/v, fever or chills.

## 2020-12-07 NOTE — ED PROVIDER NOTE - OBJECTIVE STATEMENT
86yo female with PMH colitis, HTN, hyperlipidemia, presenting with rectal bleeding. patient states that she got up to use bathroom earlier today, noticed blood when she wiped after urinating then noticed some blood in toilet. States she has worn same diaper all day, hasn't needed to change it. Denies black stools. Unsure if source from rectum, vagina, or urine. No blood thinners.

## 2020-12-07 NOTE — ED PROVIDER NOTE - PHYSICAL EXAMINATION
Gen: NAD, AOx3  Head: NCAT  Lung: CTAB, no respiratory distress, no wheezing, rales, rhonchi  CV: normal s1/s2, rrr, no murmurs, Normal perfusion  Abd: soft, NTND  MSK: No edema, no visible deformities, full range of motion in all 4 extremities  Neuro: No focal neurologic deficits  Skin: No rash   Psych: normal affect   : (Chaperoned by SHIELA Gil)- No blood present in vaginal vault, small amount of brown stool mixed with blood in diaper, rectal exam with brown stool

## 2020-12-07 NOTE — ED PROVIDER NOTE - PATIENT PORTAL LINK FT
You can access the FollowMyHealth Patient Portal offered by [facility] by registering at the following website: [email or web address]. By joining [employer]’s FollowMyHealth portal, you will also be able to view your health information using other applications (apps) compatible with our system.

## 2020-12-07 NOTE — ED ADULT NURSE NOTE - IS THE PATIENT ABLE TO BE SCREENED?
Patient : Magali Zuluaga Age: 70 year old Sex: female   MRN: [id number] Encounter Date: 8/25/2020  O35/O35      History     No chief complaint on file.    HPI           Chart Review: I have reviewed all of the pt's past and ongoing EPIC records including medications, allergies and medical history.      No Known Allergies    Current Discharge Medication List      Prior to Admission Medications    Details   amLODIPine (NORVASC) 10 MG tablet Take 1 tablet by mouth daily.  Qty: 90 tablet, Refills: 0      FLUoxetine (PROZAC) 10 MG capsule Take 1 capsule by mouth daily.  Qty: 30 capsule, Refills: 2      clonazePAM (KLONOPIN) 0.5 MG tablet Take half to one tablet po qhs prn anxiety, may make you sleepy  Qty: 30 tablet, Refills: 0      methylPREDNISolone (MEDROL DOSEPAK) 4 MG tablet Take 1 tablet by mouth as directed. follow package directions  Qty: 1 packet, Refills: 0      diclofenac (VOLTAREN) 75 MG EC tablet TAKE 1 TABLET BY MOUTH TWICE DAILY  Qty: 180 tablet, Refills: 1      cyclobenzaprine (FLEXERIL) 10 MG tablet Take 0.5 tablets by mouth 3 times daily as needed for Muscle spasms. May make you sleepy  Qty: 30 tablet, Refills: 1             Past Medical History:   Diagnosis Date   • Borderline diabetes    • Essential (primary) hypertension    • Osteopenia    • Seropositive rheumatoid arthritis of multiple sites (CMS/HCC)    • Sjogren's syndrome (CMS/HCC)        Past Surgical History:   Procedure Laterality Date   • APPENDECTOMY         Family History   Problem Relation Age of Onset   • Diabetes Mother    • Rheumatoid Arthritis Mother    • Dementia/Alzheimers Mother        Social History     Tobacco Use   • Smoking status: Current Some Day Smoker     Packs/day: 0.00     Years: 10.00     Pack years: 0.00     Types: Cigarettes   • Smokeless tobacco: Never Used   Substance Use Topics   • Alcohol use: Yes     Frequency: Monthly or less     Drinks per session: 1 or 2     Binge frequency: Never     Comment: Ocassionally    •  Drug use: No       E-cigarette/Vaping     E-Cigarette/Vaping Substances & Devices       Review of Systems    Physical Exam     ED Triage Vitals [08/25/20 1456]   ED Triage Vitals Group      Temp 99.5 °F (37.5 °C)      Heart Rate 73      Resp 18      BP (!) 132/104      SpO2 96 %      EtCO2 mmHg       Height 5' 3\" (1.6 m)      Weight       Weight Scale Used       BMI (Calculated)       IBW/kg (Calculated) 52.4       Physical Exam    ED Course     Procedures    Lab Results     No results found for this visit on 08/25/20.    EKG Results     EKG Interpretation  Rate: ***  Rhythm: {rhythm:79859}   Abnormality: {ABNORMAL:548735}    EKG tracing interpreted by ED physician    Radiology Results     Imaging Results    None         ED Medication Orders (From admission, onward)    None        MDM  Vitals  Vitals:    08/25/20 1456   BP: (!) 132/104   Pulse: 73   Resp: 18   Temp: 99.5 °F (37.5 °C)   TempSrc: Oral   SpO2: 96%   Height: 5' 3\" (1.6 m)       ED Course           MDM  Critical Care time spent on this patient outside of billable procedures:  {ED CRITICAL CARE TIMES:195566}        I have reviewed the information recorded by the scribe for accuracy and agree with its contents.    ________________________________________________________________  __    Osmin Martinez acting as a scribe for MD Dr. Patrick Goldberg  SER # [id number]  Scribe: Osmin Martinez     Yes

## 2020-12-07 NOTE — ED PROVIDER NOTE - CLINICAL SUMMARY MEDICAL DECISION MAKING FREE TEXT BOX
84yo female with rectal bleeding, does not appear to be actively bleeding in ED, h/o colitis, nontender abdomen, no AC. Will check labs, UA, reassess. Keely Arredondo DO

## 2020-12-07 NOTE — ED ADULT NURSE NOTE - NS PRO PASSIVE SMOKE EXP
Pediatric Panel Management Review  Summary:    Type of outreach:    Phone, left message for guardian to call back    Encounter routed to No Action Needed.                                                                                                                           Essence Sage,         No

## 2020-12-07 NOTE — ED ADULT NURSE NOTE - NSIMPLEMENTINTERV_GEN_ALL_ED
Implemented All Fall with Harm Risk Interventions:  [place] to call system. Call bell, personal items and telephone within reach. Instruct patient to call for assistance. Room bathroom lighting operational. Non-slip footwear when patient is off stretcher. Physically safe environment: no spills, clutter or unnecessary equipment. Stretcher in lowest position, wheels locked, appropriate side rails in place. Provide visual cue, wrist band, yellow gown, etc. Monitor gait and stability. Monitor for mental status changes and reorient to person, place, and time. Review medications for side effects contributing to fall risk. Reinforce activity limits and safety measures with patient and family. Provide visual clues: red socks.

## 2020-12-09 LAB
-  AMIKACIN: SIGNIFICANT CHANGE UP
-  AMOXICILLIN/CLAVULANIC ACID: SIGNIFICANT CHANGE UP
-  AMPICILLIN/SULBACTAM: SIGNIFICANT CHANGE UP
-  AMPICILLIN: SIGNIFICANT CHANGE UP
-  AZTREONAM: SIGNIFICANT CHANGE UP
-  CEFAZOLIN: SIGNIFICANT CHANGE UP
-  CEFEPIME: SIGNIFICANT CHANGE UP
-  CEFOXITIN: SIGNIFICANT CHANGE UP
-  CEFTRIAXONE: SIGNIFICANT CHANGE UP
-  CIPROFLOXACIN: SIGNIFICANT CHANGE UP
-  ERTAPENEM: SIGNIFICANT CHANGE UP
-  GENTAMICIN: SIGNIFICANT CHANGE UP
-  LEVOFLOXACIN: SIGNIFICANT CHANGE UP
-  MEROPENEM: SIGNIFICANT CHANGE UP
-  NITROFURANTOIN: SIGNIFICANT CHANGE UP
-  PIPERACILLIN/TAZOBACTAM: SIGNIFICANT CHANGE UP
-  TOBRAMYCIN: SIGNIFICANT CHANGE UP
-  TRIMETHOPRIM/SULFAMETHOXAZOLE: SIGNIFICANT CHANGE UP
CULTURE RESULTS: SIGNIFICANT CHANGE UP
METHOD TYPE: SIGNIFICANT CHANGE UP
ORGANISM # SPEC MICROSCOPIC CNT: SIGNIFICANT CHANGE UP
ORGANISM # SPEC MICROSCOPIC CNT: SIGNIFICANT CHANGE UP
SPECIMEN SOURCE: SIGNIFICANT CHANGE UP

## 2020-12-10 RX ORDER — CEPHALEXIN 500 MG
1 CAPSULE ORAL
Qty: 14 | Refills: 0
Start: 2020-12-10 | End: 2020-12-16

## 2021-03-11 NOTE — H&P ADULT - HISTORY OF PRESENT ILLNESS
Last Clinic Visit: 6/16/2020  [facility]      
85 y/o female with h/o CHF, chronic lower ext lymph edema, Glaucoma, was referred to the ER by home visiting nurse for a new onset. A Fib. patient advised that for the last few weeks she is experiencing attacks of SOB on and off for no reason but she did not now why. no chest pain. in the ER, patient became back to NSR with one injection of 10 mg of cardizem. patient was treated with IV lasix with resulting am hypotension, hypokalemia and back into A Fib again. Cardio consult advised to reduce lasix after another day of 40 mg iv twice a day for the massive lower ext edema, also to increase metoprolol to 25 mg po every 6 hrs for rate control. patient f/u her lower ext edema with vascular surgery Dr. Finch. o/e: large areas od candida dermatitis between the thighs, groins and legs with superimposed bacterial infection.

## 2021-11-30 NOTE — ED PROVIDER NOTE - EYES, MLM
Clear bilaterally, pupils equal, round and reactive to light. Oxybutynin Counseling:  I discussed with the patient the risks of oxybutynin including but not limited to skin rash, drowsiness, dry mouth, difficulty urinating, and blurred vision.

## 2022-06-14 NOTE — ED PROVIDER NOTE - CONSTITUTIONAL NEGATIVE STATEMENT, MLM
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  Hx rheumatic fever, congenital CHB, dilated NICMP on Coreg 12.5mg bid and Losartan 50mg daily, CHF/NYHAII, NSVT, HTN on Bidil 20-37.5mg and noncompliance. Echo at [facility] 2021 showed ejection fraction 29%, moderate diastolic dysfunction, moderate LA enlargement, mild to moderate MR. 2021 2nd opinion, Dr. Vogel, cardiologist, at [facility]. Spironolactone 25 mg daily added.     2021 K 4.0, BUN/Cr 1.07, GFR 51     IN PERSON DEVICE CHECK 3/2/2022  Medtronic Dual MRI PPM, hx high RV threshold  The device was programmed to check thresholds, lead measurements and assess underlying rhythm  The in person device check shows NORMAL FUNCTION   Presenting Rhythm:  APVP 60 bpm  Underlying Rhythm: SB CHB  Battery Voltage/Longevity:  <6-14, ave 10 mos   Percent Pacin% ASVP, 70% APVP  Tachycardia Detections/Episodes: 3 NSVT  See device parameters/results in the media  Remote connectivity verified      no fever and no chills.

## 2022-06-17 NOTE — ED CDU PROVIDER INITIAL DAY NOTE - NEUROLOGICAL, MLM
Patient is calling about lab results. She is given the following message that is in the chart from Dr. Heri Hassan:    Result(s) were reviewed and are stable or within acceptable range(s). Patient is told that Pap smear results are not back yet. She is reassured that this takes longer than other tests and she will be notified when it is resulted. Alert and oriented, no focal deficits, no motor or sensory deficits.

## 2022-08-25 NOTE — ED CDU PROVIDER INITIAL DAY NOTE - NS ED ATTENDING STATEMENT MOD
I have personally performed a face to face diagnostic evaluation on this patient. I have reviewed the ACP note and agree with the history, exam and plan of care, except as noted. Staged Advancement Flap Text: The defect edges were debeveled with a #15 scalpel blade.  Given the location of the defect, shape of the defect and the proximity to free margins a staged advancement flap was deemed most appropriate.  Using a sterile surgical marker, an appropriate advancement flap was drawn incorporating the defect and placing the expected incisions within the relaxed skin tension lines where possible. The area thus outlined was incised deep to adipose tissue with a #15 scalpel blade.  The skin margins were undermined to an appropriate distance in all directions utilizing iris scissors.

## 2022-08-30 NOTE — ED PROVIDER NOTE - NSDCPRINTRESULTS_ED_ALL_ED
Quality 130: Documentation Of Current Medications In The Medical Record: Current Medications Documented Detail Level: Detailed Quality 226: Preventive Care And Screening: Tobacco Use: Screening And Cessation Intervention: Patient screened for tobacco use and is an ex/non-smoker Patient requests all Lab and Radiology Results on their Discharge Instructions

## 2022-12-17 ENCOUNTER — OFFICE (OUTPATIENT)
Dept: URBAN - METROPOLITAN AREA CLINIC 6 | Facility: CLINIC | Age: 87
Setting detail: OPHTHALMOLOGY
End: 2022-12-17
Payer: MEDICARE

## 2022-12-17 DIAGNOSIS — Z96.1: ICD-10-CM

## 2022-12-17 DIAGNOSIS — H40.1123: ICD-10-CM

## 2022-12-17 DIAGNOSIS — H26.492: ICD-10-CM

## 2022-12-17 DIAGNOSIS — H40.1111: ICD-10-CM

## 2022-12-17 PROCEDURE — 99213 OFFICE O/P EST LOW 20 MIN: CPT | Performed by: OPHTHALMOLOGY

## 2022-12-17 ASSESSMENT — REFRACTION_MANIFEST
OD_CYLINDER: -2.25
OD_SPHERE: +0.25
OS_SPHERE: UNABLE
OD_AXIS: 080
OD_VA1: 20/20-2

## 2022-12-17 ASSESSMENT — REFRACTION_CURRENTRX
OS_SPHERE: -0.25
OS_AXIS: 074
OD_AXIS: 067
OD_VPRISM_DIRECTION: SV
OD_ADD: +3.25
OS_VPRISM_DIRECTION: SV
OD_SPHERE: -0.25
OD_OVR_VA: 20/
OS_CYLINDER: -2.00
OS_ADD: +3.25
OS_OVR_VA: 20/
OD_CYLINDER: -2.25

## 2022-12-17 ASSESSMENT — AXIALLENGTH_DERIVED
OD_AL: 23.1684
OD_AL: 23.0283

## 2022-12-17 ASSESSMENT — REFRACTION_AUTOREFRACTION
OD_CYLINDER: -2.00
OD_SPHERE: -0.25
OD_AXIS: 080

## 2022-12-17 ASSESSMENT — PACHYMETRY
OS_CT_UM: 508
OS_CT_CORRECTION: 3
OD_CT_CORRECTION: 5
OD_CT_UM: 477

## 2022-12-17 ASSESSMENT — KERATOMETRY
OS_K2POWER_DIOPTERS: 48.25
OS_K1POWER_DIOPTERS: 46.00
OD_K1POWER_DIOPTERS: 45.00
OD_K2POWER_DIOPTERS: 47.00
METHOD_AUTO_MANUAL: AUTO
OS_AXISANGLE_DEGREES: 130
OD_AXISANGLE_DEGREES: 160

## 2022-12-17 ASSESSMENT — SPHEQUIV_DERIVED
OD_SPHEQUIV: -0.875
OD_SPHEQUIV: -1.25

## 2022-12-17 ASSESSMENT — VISUAL ACUITY
OS_BCVA: 20/30+2
OD_BCVA: NLP

## 2022-12-17 ASSESSMENT — CONFRONTATIONAL VISUAL FIELD TEST (CVF)
OD_FINDINGS: FULL
OS_FINDINGS: FULL

## 2022-12-17 ASSESSMENT — TONOMETRY: OD_IOP_MMHG: 18

## 2023-04-15 ENCOUNTER — OFFICE (OUTPATIENT)
Dept: URBAN - METROPOLITAN AREA CLINIC 6 | Facility: CLINIC | Age: 88
Setting detail: OPHTHALMOLOGY
End: 2023-04-15
Payer: MEDICARE

## 2023-04-15 DIAGNOSIS — H40.1111: ICD-10-CM

## 2023-04-15 DIAGNOSIS — Z96.1: ICD-10-CM

## 2023-04-15 DIAGNOSIS — H40.1123: ICD-10-CM

## 2023-04-15 DIAGNOSIS — H26.492: ICD-10-CM

## 2023-04-15 PROCEDURE — 92083 EXTENDED VISUAL FIELD XM: CPT | Performed by: OPHTHALMOLOGY

## 2023-04-15 PROCEDURE — 99213 OFFICE O/P EST LOW 20 MIN: CPT | Performed by: OPHTHALMOLOGY

## 2023-04-15 PROCEDURE — 92133 CPTRZD OPH DX IMG PST SGM ON: CPT | Performed by: OPHTHALMOLOGY

## 2023-04-15 ASSESSMENT — REFRACTION_CURRENTRX
OS_VPRISM_DIRECTION: SV
OS_CYLINDER: -2.00
OD_VPRISM_DIRECTION: SV
OS_OVR_VA: 20/
OD_AXIS: 067
OD_SPHERE: -0.25
OS_SPHERE: -0.25
OD_OVR_VA: 20/
OD_CYLINDER: -2.25
OD_ADD: +3.25
OS_AXIS: 074
OS_ADD: +3.25

## 2023-04-15 ASSESSMENT — KERATOMETRY
OD_K1POWER_DIOPTERS: 45.25
METHOD_AUTO_MANUAL: AUTO
OD_AXISANGLE_DEGREES: 160
OS_AXISANGLE_DEGREES: 165
OD_K2POWER_DIOPTERS: 47.00
OS_K2POWER_DIOPTERS: 45.00
OS_K1POWER_DIOPTERS: 42.00

## 2023-04-15 ASSESSMENT — VISUAL ACUITY
OS_BCVA: 20/25
OD_BCVA: NLP

## 2023-04-15 ASSESSMENT — REFRACTION_MANIFEST
OS_SPHERE: UNABLE
OD_VA1: 20/20-2
OD_AXIS: 080
OD_SPHERE: +0.25
OD_CYLINDER: -2.25

## 2023-04-15 ASSESSMENT — TONOMETRY: OD_IOP_MMHG: 18

## 2023-04-15 ASSESSMENT — SPHEQUIV_DERIVED
OD_SPHEQUIV: -0.875
OD_SPHEQUIV: -1.75

## 2023-04-15 ASSESSMENT — REFRACTION_AUTOREFRACTION
OD_CYLINDER: -2.00
OD_AXIS: 072
OD_SPHERE: -0.75

## 2023-04-15 ASSESSMENT — PACHYMETRY
OS_CT_CORRECTION: 3
OD_CT_CORRECTION: 5
OS_CT_UM: 508
OD_CT_UM: 477

## 2023-04-15 ASSESSMENT — CONFRONTATIONAL VISUAL FIELD TEST (CVF)
OS_FINDINGS: FULL
OD_FINDINGS: FULL

## 2023-04-15 ASSESSMENT — AXIALLENGTH_DERIVED
OD_AL: 22.9847
OD_AL: 23.3129

## 2023-08-19 ENCOUNTER — OFFICE (OUTPATIENT)
Dept: URBAN - METROPOLITAN AREA CLINIC 6 | Facility: CLINIC | Age: 88
Setting detail: OPHTHALMOLOGY
End: 2023-08-19
Payer: MEDICARE

## 2023-08-19 DIAGNOSIS — H40.1123: ICD-10-CM

## 2023-08-19 DIAGNOSIS — Z96.1: ICD-10-CM

## 2023-08-19 DIAGNOSIS — H40.1111: ICD-10-CM

## 2023-08-19 DIAGNOSIS — H26.492: ICD-10-CM

## 2023-08-19 PROCEDURE — 92250 FUNDUS PHOTOGRAPHY W/I&R: CPT | Performed by: OPHTHALMOLOGY

## 2023-08-19 PROCEDURE — 99214 OFFICE O/P EST MOD 30 MIN: CPT | Performed by: OPHTHALMOLOGY

## 2023-08-19 ASSESSMENT — KERATOMETRY
OD_AXISANGLE_DEGREES: 152
OD_K1POWER_DIOPTERS: 45.25
OS_K2POWER_DIOPTERS: 47.75
OS_K1POWER_DIOPTERS: 45.00
OS_AXISANGLE_DEGREES: 115
OD_K2POWER_DIOPTERS: 47.25
METHOD_AUTO_MANUAL: AUTO

## 2023-08-19 ASSESSMENT — REFRACTION_MANIFEST
OD_SPHERE: +0.25
OD_AXIS: 080
OS_SPHERE: UNABLE
OD_CYLINDER: -2.25
OD_VA1: 20/20-2

## 2023-08-19 ASSESSMENT — PACHYMETRY
OS_CT_CORRECTION: 3
OD_CT_CORRECTION: 5
OD_CT_UM: 477
OS_CT_UM: 508

## 2023-08-19 ASSESSMENT — REFRACTION_CURRENTRX
OS_CYLINDER: -2.00
OS_OVR_VA: 20/
OD_ADD: +3.25
OS_VPRISM_DIRECTION: SV
OD_VPRISM_DIRECTION: SV
OD_CYLINDER: -2.25
OD_AXIS: 067
OS_AXIS: 074
OS_SPHERE: -0.25
OD_SPHERE: -0.25
OD_OVR_VA: 20/
OS_ADD: +3.25

## 2023-08-19 ASSESSMENT — SPHEQUIV_DERIVED: OD_SPHEQUIV: -0.875

## 2023-08-19 ASSESSMENT — REFRACTION_AUTOREFRACTION
OD_CYLINDER: -2.25
OD_AXIS: 70
OD_SPHERE: PLANO

## 2023-08-19 ASSESSMENT — CONFRONTATIONAL VISUAL FIELD TEST (CVF)
OS_FINDINGS: FULL
OD_FINDINGS: FULL

## 2023-08-19 ASSESSMENT — VISUAL ACUITY
OS_BCVA: 20/30+1
OD_BCVA: NLP

## 2023-08-19 ASSESSMENT — TONOMETRY: OD_IOP_MMHG: 18

## 2023-08-19 ASSESSMENT — AXIALLENGTH_DERIVED: OD_AL: 22.9412

## 2023-10-10 ENCOUNTER — NON-APPOINTMENT (OUTPATIENT)
Age: 88
End: 2023-10-10

## 2023-12-16 ENCOUNTER — OFFICE (OUTPATIENT)
Dept: URBAN - METROPOLITAN AREA CLINIC 6 | Facility: CLINIC | Age: 88
Setting detail: OPHTHALMOLOGY
End: 2023-12-16
Payer: MEDICARE

## 2023-12-16 DIAGNOSIS — Z96.1: ICD-10-CM

## 2023-12-16 DIAGNOSIS — H40.1111: ICD-10-CM

## 2023-12-16 DIAGNOSIS — H26.492: ICD-10-CM

## 2023-12-16 DIAGNOSIS — H40.1123: ICD-10-CM

## 2023-12-16 PROCEDURE — 99213 OFFICE O/P EST LOW 20 MIN: CPT | Performed by: OPHTHALMOLOGY

## 2023-12-16 ASSESSMENT — REFRACTION_MANIFEST
OS_SPHERE: UNABLE
OD_SPHERE: +0.25
OD_VA1: 20/20-2
OD_AXIS: 080
OD_CYLINDER: -2.25

## 2023-12-16 ASSESSMENT — REFRACTION_AUTOREFRACTION
OD_SPHERE: -0.75
OS_AXIS: 073
OS_SPHERE: -18.25
OD_AXIS: 075
OS_CYLINDER: -0.25
OD_CYLINDER: -2.00

## 2023-12-16 ASSESSMENT — REFRACTION_CURRENTRX
OS_AXIS: 070
OS_VPRISM_DIRECTION: BF
OD_AXIS: 076
OS_ADD: +3.25
OS_OVR_VA: 20/
OS_SPHERE: -0.25
OD_VPRISM_DIRECTION: BF
OD_ADD: +3.25
OD_CYLINDER: -2.00
OD_OVR_VA: 20/
OS_CYLINDER: -2.00
OD_SPHERE: -0.25

## 2023-12-16 ASSESSMENT — SPHEQUIV_DERIVED
OD_SPHEQUIV: -1.75
OS_SPHEQUIV: -18.375
OD_SPHEQUIV: -0.875

## 2023-12-16 ASSESSMENT — CONFRONTATIONAL VISUAL FIELD TEST (CVF)
OS_FINDINGS: FULL
OD_FINDINGS: FULL

## 2023-12-16 ASSESSMENT — LID EXAM ASSESSMENTS: OD_ECCHYMOSIS: RUL T

## 2023-12-21 ENCOUNTER — APPOINTMENT (OUTPATIENT)
Dept: VASCULAR SURGERY | Facility: CLINIC | Age: 88
End: 2023-12-21
Payer: MEDICARE

## 2023-12-21 VITALS
HEART RATE: 82 BPM | RESPIRATION RATE: 16 BRPM | WEIGHT: 130 LBS | HEIGHT: 55 IN | OXYGEN SATURATION: 96 % | TEMPERATURE: 97.9 F | SYSTOLIC BLOOD PRESSURE: 148 MMHG | DIASTOLIC BLOOD PRESSURE: 76 MMHG | BODY MASS INDEX: 30.09 KG/M2

## 2023-12-21 DIAGNOSIS — Z78.9 OTHER SPECIFIED HEALTH STATUS: ICD-10-CM

## 2023-12-21 DIAGNOSIS — Z86.69 PERSONAL HISTORY OF OTHER DISEASES OF THE NERVOUS SYSTEM AND SENSE ORGANS: ICD-10-CM

## 2023-12-21 DIAGNOSIS — I87.2 VENOUS INSUFFICIENCY (CHRONIC) (PERIPHERAL): ICD-10-CM

## 2023-12-21 DIAGNOSIS — I50.9 HEART FAILURE, UNSPECIFIED: ICD-10-CM

## 2023-12-21 PROCEDURE — 99202 OFFICE O/P NEW SF 15 MIN: CPT

## 2023-12-21 RX ORDER — TRAVOPROST 0.04 MG/ML
0 SOLUTION OPHTHALMIC
Refills: 0 | Status: ACTIVE | COMMUNITY

## 2023-12-21 RX ORDER — POTASSIUM CHLORIDE 1500 MG/1
20 TABLET, EXTENDED RELEASE ORAL
Refills: 0 | Status: ACTIVE | COMMUNITY

## 2023-12-21 RX ORDER — ASPIRIN 81 MG
81 TABLET, DELAYED RELEASE (ENTERIC COATED) ORAL
Refills: 0 | Status: ACTIVE | COMMUNITY

## 2023-12-21 RX ORDER — SPIRONOLACTONE 25 MG/1
25 TABLET ORAL
Refills: 0 | Status: ACTIVE | COMMUNITY

## 2023-12-21 RX ORDER — CLOTRIMAZOLE 10 MG/G
1 CREAM TOPICAL
Refills: 0 | Status: ACTIVE | COMMUNITY

## 2023-12-21 RX ORDER — BUMETANIDE 2 MG/1
2 TABLET ORAL
Refills: 0 | Status: ACTIVE | COMMUNITY

## 2023-12-21 RX ORDER — METOPROLOL SUCCINATE 25 MG/1
25 TABLET, EXTENDED RELEASE ORAL
Refills: 0 | Status: ACTIVE | COMMUNITY

## 2023-12-22 PROBLEM — I50.9 HEART FAILURE: Status: ACTIVE | Noted: 2023-12-22

## 2023-12-22 PROBLEM — I87.2 VENOUS INSUFFICIENCY: Status: ACTIVE | Noted: 2023-12-22

## 2023-12-22 NOTE — PHYSICAL EXAM
[Respiratory Effort] : normal respiratory effort [Normal Rate and Rhythm] : normal rate and rhythm [Ankle Swelling (On Exam)] : present [Varicose Veins Of Lower Extremities] : bilaterally [] : bilaterally [Ankle Swelling Bilaterally] : severe [Abdomen Tenderness] : ~T ~M No abdominal tenderness [No Rash or Lesion] : No rash or lesion [Alert] : alert [Oriented to Person] : oriented to person [Oriented to Place] : oriented to place [Oriented to Time] : oriented to time [Calm] : calm [de-identified] : no acute distress noted, elderly female [de-identified] : normocephalic [FreeTextEntry1] : non palpable pedal pulses, no wounds [de-identified] : moves all extremities

## 2023-12-22 NOTE — ASSESSMENT
[FreeTextEntry1] : 89 YO F with bilateral C5 venous disease - juxta lyte compression stocking prescription provided - return to clinic 6 months

## 2023-12-22 NOTE — HISTORY OF PRESENT ILLNESS
[FreeTextEntry1] : 87 YO F with multiple comorbidities including CHF presents for new patient evaluation regarding bilateral lower extremity swelling. She denies DVT history. She does wear compression. Has had venous ulcers in the past which have gone on to heal. She has not open wounds for the past 4-5 years. No claudication symptoms. Never smoker

## 2023-12-27 ENCOUNTER — EMERGENCY (EMERGENCY)
Facility: HOSPITAL | Age: 88
LOS: 1 days | Discharge: HOME | End: 2023-12-27
Attending: EMERGENCY MEDICINE
Payer: MEDICARE

## 2023-12-27 VITALS
DIASTOLIC BLOOD PRESSURE: 80 MMHG | HEART RATE: 89 BPM | SYSTOLIC BLOOD PRESSURE: 147 MMHG | RESPIRATION RATE: 20 BRPM | OXYGEN SATURATION: 94 % | TEMPERATURE: 98 F

## 2023-12-27 LAB
ALBUMIN SERPL ELPH-MCNC: 2.9 G/DL — LOW (ref 3.3–5.2)
ALBUMIN SERPL ELPH-MCNC: 2.9 G/DL — LOW (ref 3.3–5.2)
ALP SERPL-CCNC: 129 U/L — HIGH (ref 40–120)
ALP SERPL-CCNC: 129 U/L — HIGH (ref 40–120)
ALT FLD-CCNC: 9 U/L — SIGNIFICANT CHANGE UP
ALT FLD-CCNC: 9 U/L — SIGNIFICANT CHANGE UP
ANION GAP SERPL CALC-SCNC: 11 MMOL/L — SIGNIFICANT CHANGE UP (ref 5–17)
ANION GAP SERPL CALC-SCNC: 11 MMOL/L — SIGNIFICANT CHANGE UP (ref 5–17)
APTT BLD: 30.3 SEC — SIGNIFICANT CHANGE UP (ref 24.5–35.6)
APTT BLD: 30.3 SEC — SIGNIFICANT CHANGE UP (ref 24.5–35.6)
AST SERPL-CCNC: 24 U/L — SIGNIFICANT CHANGE UP
AST SERPL-CCNC: 24 U/L — SIGNIFICANT CHANGE UP
BASOPHILS # BLD AUTO: 0.05 K/UL — SIGNIFICANT CHANGE UP (ref 0–0.2)
BASOPHILS # BLD AUTO: 0.05 K/UL — SIGNIFICANT CHANGE UP (ref 0–0.2)
BASOPHILS NFR BLD AUTO: 0.5 % — SIGNIFICANT CHANGE UP (ref 0–2)
BASOPHILS NFR BLD AUTO: 0.5 % — SIGNIFICANT CHANGE UP (ref 0–2)
BILIRUB SERPL-MCNC: 0.3 MG/DL — LOW (ref 0.4–2)
BILIRUB SERPL-MCNC: 0.3 MG/DL — LOW (ref 0.4–2)
BUN SERPL-MCNC: 24.2 MG/DL — HIGH (ref 8–20)
BUN SERPL-MCNC: 24.2 MG/DL — HIGH (ref 8–20)
CALCIUM SERPL-MCNC: 8.7 MG/DL — SIGNIFICANT CHANGE UP (ref 8.4–10.5)
CALCIUM SERPL-MCNC: 8.7 MG/DL — SIGNIFICANT CHANGE UP (ref 8.4–10.5)
CHLORIDE SERPL-SCNC: 96 MMOL/L — SIGNIFICANT CHANGE UP (ref 96–108)
CHLORIDE SERPL-SCNC: 96 MMOL/L — SIGNIFICANT CHANGE UP (ref 96–108)
CO2 SERPL-SCNC: 25 MMOL/L — SIGNIFICANT CHANGE UP (ref 22–29)
CO2 SERPL-SCNC: 25 MMOL/L — SIGNIFICANT CHANGE UP (ref 22–29)
CREAT SERPL-MCNC: 1.25 MG/DL — SIGNIFICANT CHANGE UP (ref 0.5–1.3)
CREAT SERPL-MCNC: 1.25 MG/DL — SIGNIFICANT CHANGE UP (ref 0.5–1.3)
EGFR: 41 ML/MIN/1.73M2 — LOW
EGFR: 41 ML/MIN/1.73M2 — LOW
EOSINOPHIL # BLD AUTO: 0.1 K/UL — SIGNIFICANT CHANGE UP (ref 0–0.5)
EOSINOPHIL # BLD AUTO: 0.1 K/UL — SIGNIFICANT CHANGE UP (ref 0–0.5)
EOSINOPHIL NFR BLD AUTO: 1.1 % — SIGNIFICANT CHANGE UP (ref 0–6)
EOSINOPHIL NFR BLD AUTO: 1.1 % — SIGNIFICANT CHANGE UP (ref 0–6)
GLUCOSE SERPL-MCNC: 106 MG/DL — HIGH (ref 70–99)
GLUCOSE SERPL-MCNC: 106 MG/DL — HIGH (ref 70–99)
HCT VFR BLD CALC: 35.1 % — SIGNIFICANT CHANGE UP (ref 34.5–45)
HCT VFR BLD CALC: 35.1 % — SIGNIFICANT CHANGE UP (ref 34.5–45)
HGB BLD-MCNC: 12.1 G/DL — SIGNIFICANT CHANGE UP (ref 11.5–15.5)
HGB BLD-MCNC: 12.1 G/DL — SIGNIFICANT CHANGE UP (ref 11.5–15.5)
IMM GRANULOCYTES NFR BLD AUTO: 0.6 % — SIGNIFICANT CHANGE UP (ref 0–0.9)
IMM GRANULOCYTES NFR BLD AUTO: 0.6 % — SIGNIFICANT CHANGE UP (ref 0–0.9)
INR BLD: 1.04 RATIO — SIGNIFICANT CHANGE UP (ref 0.85–1.18)
INR BLD: 1.04 RATIO — SIGNIFICANT CHANGE UP (ref 0.85–1.18)
LYMPHOCYTES # BLD AUTO: 0.91 K/UL — LOW (ref 1–3.3)
LYMPHOCYTES # BLD AUTO: 0.91 K/UL — LOW (ref 1–3.3)
LYMPHOCYTES # BLD AUTO: 9.7 % — LOW (ref 13–44)
LYMPHOCYTES # BLD AUTO: 9.7 % — LOW (ref 13–44)
MCHC RBC-ENTMCNC: 29.7 PG — SIGNIFICANT CHANGE UP (ref 27–34)
MCHC RBC-ENTMCNC: 29.7 PG — SIGNIFICANT CHANGE UP (ref 27–34)
MCHC RBC-ENTMCNC: 34.5 GM/DL — SIGNIFICANT CHANGE UP (ref 32–36)
MCHC RBC-ENTMCNC: 34.5 GM/DL — SIGNIFICANT CHANGE UP (ref 32–36)
MCV RBC AUTO: 86 FL — SIGNIFICANT CHANGE UP (ref 80–100)
MCV RBC AUTO: 86 FL — SIGNIFICANT CHANGE UP (ref 80–100)
MONOCYTES # BLD AUTO: 0.87 K/UL — SIGNIFICANT CHANGE UP (ref 0–0.9)
MONOCYTES # BLD AUTO: 0.87 K/UL — SIGNIFICANT CHANGE UP (ref 0–0.9)
MONOCYTES NFR BLD AUTO: 9.2 % — SIGNIFICANT CHANGE UP (ref 2–14)
MONOCYTES NFR BLD AUTO: 9.2 % — SIGNIFICANT CHANGE UP (ref 2–14)
NEUTROPHILS # BLD AUTO: 7.43 K/UL — HIGH (ref 1.8–7.4)
NEUTROPHILS # BLD AUTO: 7.43 K/UL — HIGH (ref 1.8–7.4)
NEUTROPHILS NFR BLD AUTO: 78.9 % — HIGH (ref 43–77)
NEUTROPHILS NFR BLD AUTO: 78.9 % — HIGH (ref 43–77)
PLATELET # BLD AUTO: 253 K/UL — SIGNIFICANT CHANGE UP (ref 150–400)
PLATELET # BLD AUTO: 253 K/UL — SIGNIFICANT CHANGE UP (ref 150–400)
POTASSIUM SERPL-MCNC: 4.4 MMOL/L — SIGNIFICANT CHANGE UP (ref 3.5–5.3)
POTASSIUM SERPL-MCNC: 4.4 MMOL/L — SIGNIFICANT CHANGE UP (ref 3.5–5.3)
POTASSIUM SERPL-SCNC: 4.4 MMOL/L — SIGNIFICANT CHANGE UP (ref 3.5–5.3)
POTASSIUM SERPL-SCNC: 4.4 MMOL/L — SIGNIFICANT CHANGE UP (ref 3.5–5.3)
PROT SERPL-MCNC: 6.5 G/DL — LOW (ref 6.6–8.7)
PROT SERPL-MCNC: 6.5 G/DL — LOW (ref 6.6–8.7)
PROTHROM AB SERPL-ACNC: 11.5 SEC — SIGNIFICANT CHANGE UP (ref 9.5–13)
PROTHROM AB SERPL-ACNC: 11.5 SEC — SIGNIFICANT CHANGE UP (ref 9.5–13)
RBC # BLD: 4.08 M/UL — SIGNIFICANT CHANGE UP (ref 3.8–5.2)
RBC # BLD: 4.08 M/UL — SIGNIFICANT CHANGE UP (ref 3.8–5.2)
RBC # FLD: 12.8 % — SIGNIFICANT CHANGE UP (ref 10.3–14.5)
RBC # FLD: 12.8 % — SIGNIFICANT CHANGE UP (ref 10.3–14.5)
SODIUM SERPL-SCNC: 132 MMOL/L — LOW (ref 135–145)
SODIUM SERPL-SCNC: 132 MMOL/L — LOW (ref 135–145)
WBC # BLD: 9.42 K/UL — SIGNIFICANT CHANGE UP (ref 3.8–10.5)
WBC # BLD: 9.42 K/UL — SIGNIFICANT CHANGE UP (ref 3.8–10.5)
WBC # FLD AUTO: 9.42 K/UL — SIGNIFICANT CHANGE UP (ref 3.8–10.5)
WBC # FLD AUTO: 9.42 K/UL — SIGNIFICANT CHANGE UP (ref 3.8–10.5)

## 2023-12-27 PROCEDURE — 73502 X-RAY EXAM HIP UNI 2-3 VIEWS: CPT | Mod: 26,RT

## 2023-12-27 PROCEDURE — 99223 1ST HOSP IP/OBS HIGH 75: CPT | Mod: FS

## 2023-12-27 PROCEDURE — 72125 CT NECK SPINE W/O DYE: CPT | Mod: 26,MG

## 2023-12-27 PROCEDURE — 71045 X-RAY EXAM CHEST 1 VIEW: CPT | Mod: 26

## 2023-12-27 PROCEDURE — 93010 ELECTROCARDIOGRAM REPORT: CPT | Mod: 76

## 2023-12-27 PROCEDURE — 73564 X-RAY EXAM KNEE 4 OR MORE: CPT | Mod: 26,50

## 2023-12-27 PROCEDURE — G1004: CPT

## 2023-12-27 PROCEDURE — 70450 CT HEAD/BRAIN W/O DYE: CPT | Mod: 26,MG

## 2023-12-27 RX ORDER — METOPROLOL TARTRATE 50 MG
50 TABLET ORAL DAILY
Refills: 0 | Status: DISCONTINUED | OUTPATIENT
Start: 2023-12-27 | End: 2023-12-29

## 2023-12-27 RX ORDER — SPIRONOLACTONE 25 MG/1
25 TABLET, FILM COATED ORAL DAILY
Refills: 0 | Status: DISCONTINUED | OUTPATIENT
Start: 2023-12-27 | End: 2024-01-04

## 2023-12-27 RX ORDER — BUMETANIDE 0.25 MG/ML
2 INJECTION INTRAMUSCULAR; INTRAVENOUS DAILY
Refills: 0 | Status: DISCONTINUED | OUTPATIENT
Start: 2023-12-27 | End: 2024-01-04

## 2023-12-27 RX ORDER — ASPIRIN/CALCIUM CARB/MAGNESIUM 324 MG
81 TABLET ORAL DAILY
Refills: 0 | Status: DISCONTINUED | OUTPATIENT
Start: 2023-12-27 | End: 2024-01-04

## 2023-12-27 NOTE — ED PROVIDER NOTE - PHYSICAL EXAMINATION
Constitutional - well-developed.   Head - NCAT. Airway patent.   Eyes - PERRL.   CV - RRR. no murmur. no edema.   Pulm - CTAB.   Abd - soft, nt. no rebound. no guarding.   Neuro - A&Ox3. strength 5/5 x4. sensation intact x4. normal gait.   Skin - No rash. .  MSK - normal ROM. R lateral hip with soft tissue hematoma.

## 2023-12-27 NOTE — ED ADULT NURSE REASSESSMENT NOTE - NS ED NURSE REASSESS COMMENT FT1
Assumed care of patient at approx 23:00. Patient AxOx4, hard of hearing. Patient denies any pain/discomfort at this time. Patient has been updated on the plan of care. Patient offers no complaints at this time. No visible signs of acute distress noted, safety maintained.

## 2023-12-27 NOTE — ED CDU PROVIDER INITIAL DAY NOTE - OBJECTIVE STATEMENT
88 yo F pmh HTN, HLD presenting with syncope. Pt states that she was at home this evening and she was walking to the bathroom. Pt states that she felt very weak and lightheaded.  Pt states that she sat down but then proceeded to fall out of the chair and hit her head.  pt states that she doesn't think she passed out completely.  no cp. no sob. no n/v. no fever/chills. Pt also states that she hit her R hip and has swelling to that site. 89 yo F pmh HTN, HLD presenting with syncope. Pt states that she was at home this evening and she was walking to the bathroom. Pt states that she felt very weak and lightheaded.  Pt states that she sat down but then proceeded to fall out of the chair and hit her head.  pt states that she doesn't think she passed out completely.  no cp. no sob. no n/v. no fever/chills. Pt also states that she hit her R hip and has swelling to that site.

## 2023-12-27 NOTE — ED PROVIDER NOTE - CLINICAL SUMMARY MEDICAL DECISION MAKING FREE TEXT BOX
labs and imaging reviewed. Pt with near syncope v. syncope. workup neg.  Pt has R hip contusion. no fx on xr.  Pt lives alone and has pain. will hold for cards eval and PT eval in AM.

## 2023-12-27 NOTE — ED ADULT TRIAGE NOTE - CHIEF COMPLAINT QUOTE
Pt BIBA s/p syncopal episode at home.  Pt states she felt warm and lightheaded and her walker went out from under her.  Reports bilat leg pain.  No neuro deficits noted.

## 2023-12-27 NOTE — ED ADULT NURSE NOTE - NSFALLHARMRISKINTERV_ED_ALL_ED
Assistance OOB with selected safe patient handling equipment if applicable/Assistance with ambulation/Communicate risk of Fall with Harm to all staff, patient, and family/Monitor gait and stability/Provide patient with walking aids/Provide visual cue: red socks, yellow wristband, yellow gown, etc/Reinforce activity limits and safety measures with patient and family/Bed in lowest position, wheels locked, appropriate side rails in place/Call bell, personal items and telephone in reach/Instruct patient to call for assistance before getting out of bed/chair/stretcher/Non-slip footwear applied when patient is off stretcher/[place] to call system/Physically safe environment - no spills, clutter or unnecessary equipment/Purposeful Proactive Rounding/Room/bathroom lighting operational, light cord in reach Assistance OOB with selected safe patient handling equipment if applicable/Assistance with ambulation/Communicate risk of Fall with Harm to all staff, patient, and family/Monitor gait and stability/Provide patient with walking aids/Provide visual cue: red socks, yellow wristband, yellow gown, etc/Reinforce activity limits and safety measures with patient and family/Bed in lowest position, wheels locked, appropriate side rails in place/Call bell, personal items and telephone in reach/Instruct patient to call for assistance before getting out of bed/chair/stretcher/Non-slip footwear applied when patient is off stretcher/[place] to call system/Physically safe environment - no spills, clutter or unnecessary equipment/Purposeful Proactive Rounding/Room/bathroom lighting operational, light cord in reach

## 2023-12-27 NOTE — ED CDU PROVIDER INITIAL DAY NOTE - CLINICAL SUMMARY MEDICAL DECISION MAKING FREE TEXT BOX
89F with pmh HTN, HLD presenting with near syncope v. syncope. labs wnl, EKG non ischemic.  Pt has R hip contusion but no fx on xr.  Pt lives alone and has pain. Placed in obs for cards eval and PT eval in AM.

## 2023-12-27 NOTE — ED ADULT NURSE NOTE - CAS TRG GENERAL NORM CIRC DET
Benefits, risks, and possible complications of procedure explained to patient/caregiver who verbalized understanding and gave verbal consent. Strong peripheral pulses

## 2023-12-27 NOTE — ED ADULT NURSE NOTE - OBJECTIVE STATEMENT
Patient is A&Ox4, in NAD. Patient presents to ED s/p syncopal episode in her bedroom. Patient states she got up to go to the bathroom when her walker gave out underneath her and the next thing she remembers she was on the floor. Denies anticoagulation use. Pt c/o right leg pain. States she hit her head. RR even and unlabored. Abdomen soft, NT/ND. Skin is warm, dry and color appropriate for race. Pt taken to xray at this time. Aware of POC.

## 2023-12-27 NOTE — ED PROVIDER NOTE - OBJECTIVE STATEMENT
Pt is an 88 yo F co syncope. Pt states that she was at home this evening and she was walking to the bathroom. Pt states that she felt very weak and lightheaded.  Pt states that she sat down but then proceeded to fall out of the chair and hit her head.  pt states that she doesn't think she passed out completely.  no cp. no sob. no n/v. no fever/chills. Pt also states that she hit her R hip and has swelling to that site. Pt is an 89 yo F co syncope. Pt states that she was at home this evening and she was walking to the bathroom. Pt states that she felt very weak and lightheaded.  Pt states that she sat down but then proceeded to fall out of the chair and hit her head.  pt states that she doesn't think she passed out completely.  no cp. no sob. no n/v. no fever/chills. Pt also states that she hit her R hip and has swelling to that site.

## 2023-12-27 NOTE — ED CDU PROVIDER INITIAL DAY NOTE - WR ORDER DATE AND TIME 2
Called in script for Januvia also called in Sildenafil  50mg 12 tablets with 2 refills per Dr. Shahid Arce.
27-Dec-2023 17:30

## 2023-12-28 LAB
ALBUMIN SERPL ELPH-MCNC: 3.4 G/DL — SIGNIFICANT CHANGE UP (ref 3.3–5.2)
ALBUMIN SERPL ELPH-MCNC: 3.4 G/DL — SIGNIFICANT CHANGE UP (ref 3.3–5.2)
ALP SERPL-CCNC: 137 U/L — HIGH (ref 40–120)
ALP SERPL-CCNC: 137 U/L — HIGH (ref 40–120)
ALT FLD-CCNC: 10 U/L — SIGNIFICANT CHANGE UP
ALT FLD-CCNC: 10 U/L — SIGNIFICANT CHANGE UP
ANION GAP SERPL CALC-SCNC: 14 MMOL/L — SIGNIFICANT CHANGE UP (ref 5–17)
ANION GAP SERPL CALC-SCNC: 14 MMOL/L — SIGNIFICANT CHANGE UP (ref 5–17)
AST SERPL-CCNC: 21 U/L — SIGNIFICANT CHANGE UP
AST SERPL-CCNC: 21 U/L — SIGNIFICANT CHANGE UP
BILIRUB SERPL-MCNC: 0.6 MG/DL — SIGNIFICANT CHANGE UP (ref 0.4–2)
BILIRUB SERPL-MCNC: 0.6 MG/DL — SIGNIFICANT CHANGE UP (ref 0.4–2)
BUN SERPL-MCNC: 23.6 MG/DL — HIGH (ref 8–20)
BUN SERPL-MCNC: 23.6 MG/DL — HIGH (ref 8–20)
CALCIUM SERPL-MCNC: 9.3 MG/DL — SIGNIFICANT CHANGE UP (ref 8.4–10.5)
CALCIUM SERPL-MCNC: 9.3 MG/DL — SIGNIFICANT CHANGE UP (ref 8.4–10.5)
CHLORIDE SERPL-SCNC: 93 MMOL/L — LOW (ref 96–108)
CHLORIDE SERPL-SCNC: 93 MMOL/L — LOW (ref 96–108)
CO2 SERPL-SCNC: 25 MMOL/L — SIGNIFICANT CHANGE UP (ref 22–29)
CO2 SERPL-SCNC: 25 MMOL/L — SIGNIFICANT CHANGE UP (ref 22–29)
CREAT SERPL-MCNC: 1.38 MG/DL — HIGH (ref 0.5–1.3)
CREAT SERPL-MCNC: 1.38 MG/DL — HIGH (ref 0.5–1.3)
EGFR: 37 ML/MIN/1.73M2 — LOW
EGFR: 37 ML/MIN/1.73M2 — LOW
GLUCOSE SERPL-MCNC: 114 MG/DL — HIGH (ref 70–99)
GLUCOSE SERPL-MCNC: 114 MG/DL — HIGH (ref 70–99)
HCT VFR BLD CALC: 33.7 % — LOW (ref 34.5–45)
HCT VFR BLD CALC: 33.7 % — LOW (ref 34.5–45)
HGB BLD-MCNC: 11.8 G/DL — SIGNIFICANT CHANGE UP (ref 11.5–15.5)
HGB BLD-MCNC: 11.8 G/DL — SIGNIFICANT CHANGE UP (ref 11.5–15.5)
MCHC RBC-ENTMCNC: 30.5 PG — SIGNIFICANT CHANGE UP (ref 27–34)
MCHC RBC-ENTMCNC: 30.5 PG — SIGNIFICANT CHANGE UP (ref 27–34)
MCHC RBC-ENTMCNC: 35 GM/DL — SIGNIFICANT CHANGE UP (ref 32–36)
MCHC RBC-ENTMCNC: 35 GM/DL — SIGNIFICANT CHANGE UP (ref 32–36)
MCV RBC AUTO: 87.1 FL — SIGNIFICANT CHANGE UP (ref 80–100)
MCV RBC AUTO: 87.1 FL — SIGNIFICANT CHANGE UP (ref 80–100)
PLATELET # BLD AUTO: 283 K/UL — SIGNIFICANT CHANGE UP (ref 150–400)
PLATELET # BLD AUTO: 283 K/UL — SIGNIFICANT CHANGE UP (ref 150–400)
POTASSIUM SERPL-MCNC: 3.8 MMOL/L — SIGNIFICANT CHANGE UP (ref 3.5–5.3)
POTASSIUM SERPL-MCNC: 3.8 MMOL/L — SIGNIFICANT CHANGE UP (ref 3.5–5.3)
POTASSIUM SERPL-SCNC: 3.8 MMOL/L — SIGNIFICANT CHANGE UP (ref 3.5–5.3)
POTASSIUM SERPL-SCNC: 3.8 MMOL/L — SIGNIFICANT CHANGE UP (ref 3.5–5.3)
PROT SERPL-MCNC: 6.9 G/DL — SIGNIFICANT CHANGE UP (ref 6.6–8.7)
PROT SERPL-MCNC: 6.9 G/DL — SIGNIFICANT CHANGE UP (ref 6.6–8.7)
RBC # BLD: 3.87 M/UL — SIGNIFICANT CHANGE UP (ref 3.8–5.2)
RBC # BLD: 3.87 M/UL — SIGNIFICANT CHANGE UP (ref 3.8–5.2)
RBC # FLD: 12.9 % — SIGNIFICANT CHANGE UP (ref 10.3–14.5)
RBC # FLD: 12.9 % — SIGNIFICANT CHANGE UP (ref 10.3–14.5)
SODIUM SERPL-SCNC: 132 MMOL/L — LOW (ref 135–145)
SODIUM SERPL-SCNC: 132 MMOL/L — LOW (ref 135–145)
TROPONIN T, HIGH SENSITIVITY RESULT: 21 NG/L — SIGNIFICANT CHANGE UP (ref 0–51)
WBC # BLD: 7.72 K/UL — SIGNIFICANT CHANGE UP (ref 3.8–10.5)
WBC # BLD: 7.72 K/UL — SIGNIFICANT CHANGE UP (ref 3.8–10.5)
WBC # FLD AUTO: 7.72 K/UL — SIGNIFICANT CHANGE UP (ref 3.8–10.5)
WBC # FLD AUTO: 7.72 K/UL — SIGNIFICANT CHANGE UP (ref 3.8–10.5)

## 2023-12-28 PROCEDURE — 93010 ELECTROCARDIOGRAM REPORT: CPT

## 2023-12-28 PROCEDURE — 93306 TTE W/DOPPLER COMPLETE: CPT | Mod: 26

## 2023-12-28 PROCEDURE — 99233 SBSQ HOSP IP/OBS HIGH 50: CPT

## 2023-12-28 RX ORDER — SODIUM CHLORIDE 9 MG/ML
500 INJECTION INTRAMUSCULAR; INTRAVENOUS; SUBCUTANEOUS
Refills: 0 | Status: DISCONTINUED | OUTPATIENT
Start: 2023-12-28 | End: 2024-01-04

## 2023-12-28 RX ADMIN — BUMETANIDE 2 MILLIGRAM(S): 0.25 INJECTION INTRAMUSCULAR; INTRAVENOUS at 06:39

## 2023-12-28 RX ADMIN — SODIUM CHLORIDE 125 MILLILITER(S): 9 INJECTION INTRAMUSCULAR; INTRAVENOUS; SUBCUTANEOUS at 16:45

## 2023-12-28 RX ADMIN — SPIRONOLACTONE 25 MILLIGRAM(S): 25 TABLET, FILM COATED ORAL at 06:39

## 2023-12-28 RX ADMIN — Medication 50 MILLIGRAM(S): at 06:39

## 2023-12-28 RX ADMIN — Medication 81 MILLIGRAM(S): at 14:04

## 2023-12-28 NOTE — ED CDU PROVIDER SUBSEQUENT DAY NOTE - ATTENDING APP SHARED VISIT CONTRIBUTION OF CARE
Tita DEL RIOHB-61-[id number]-old female placed in observation unit for near syncope episode.  Patient has no acute chest pain.  But patient had a repeat episode of near syncope after the PT eval.    Patient is alert well-appearing elderly female, S1-S2 normal regular, bilateral clear breath sounds, abdomen is soft nontender nondistended, neuro exam alert oriented x 3 no focal deficits, skin warm dry    Patient seen by cardiology and was planned to be discharged based on old echo and serial troponin however after evaluation by cardiology has recommended near syncope episode where she went very pale.  Patient the episode was witnessed by PT and the nurse.  Will recall cardiology and continue to watch on telemetry repeat EKG and fs were normal Tita DEL RIOHX-[id number]-old female placed in observation unit for near syncope episode.  Patient has no acute chest pain.  But patient had a repeat episode of near syncope after the PT eval.    Patient is alert well-appearing elderly female, S1-S2 normal regular, bilateral clear breath sounds, abdomen is soft nontender nondistended, neuro exam alert oriented x 3 no focal deficits, skin warm dry    Patient seen by cardiology and was planned to be discharged based on old echo and serial troponin however after evaluation by cardiology has recommended near syncope episode where she went very pale.  Patient the episode was witnessed by PT and the nurse.  Will recall cardiology and continue to watch on telemetry repeat EKG and fs were normal

## 2023-12-28 NOTE — CONSULT NOTE ADULT - SUBJECTIVE AND OBJECTIVE BOX
[facility], THE HEART CENTER                                   [address] [place] [address]                                                      PHONE: [phone]                                                         FAX: [phone]  [email or web address]  ---------------------------------------------------------------------------------------------------------------------------------    Reason for Consult: Near syncope    HPI:  89 year old female with a PMHx of HTN, HLD, chronic HFpEF, lymphedema, afib who presents with near syncope.      PAST MEDICAL & SURGICAL HISTORY:  Glaucoma      Hyperlipemia      Hypertension      Colitis      Edema      Cataract of left eye          lactose (Other)  ACE inhibitors (Angioedema; Blisters; Anaphylaxis)  Benzalkonium Chloride (Unknown)      MEDICATIONS  (STANDING):  aspirin  chewable 81 milliGRAM(s) Oral daily  buMETAnide 2 milliGRAM(s) Oral daily  metoprolol succinate ER 50 milliGRAM(s) Oral daily  spironolactone 25 milliGRAM(s) Oral daily    MEDICATIONS  (PRN):      Social History:  Cigarettes:  Denies current tobacco use                  Alcohol:  Denies daily etoh            Illicit Drug Abuse:  Denies    Family History:  denies CAD, MI, CVA, or sudden death.    ROS: Negative other than as mentioned in HPI.    Vital Signs Last 24 Hrs  T(C): 36.8 (28 Dec 2023 07:48), Max: 37.2 (27 Dec 2023 19:51)  T(F): 98.3 (28 Dec 2023 07:48), Max: 98.9 (27 Dec 2023 19:51)  HR: 69 (28 Dec 2023 07:48) (69 - 98)  BP: 93/55 (28 Dec 2023 07:48) (93/55 - 147/80)  BP(mean): --  RR: 18 (28 Dec 2023 07:48) (18 - 20)  SpO2: 97% (28 Dec 2023 07:48) (94% - 98%)    Parameters below as of 28 Dec 2023 06:09  Patient On (Oxygen Delivery Method): room air      ICU Vital Signs Last 24 Hrs  ANN HENAO  I&O's Detail    I&O's Summary    Drug Dosing Weight  ANN HENAO      PHYSICAL EXAM:  General: NAD  HEENT: Head; normocephalic, atraumatic.  Eyes: EOMI, conjunctiva normal  Neck: Supple, no JVD noted  CARDIOVASCULAR: RRR, S1 S2, No murmurs or gallops  LUNGS: Clear to auscultation b/l, No rales, rhonchi or wheeze, normal inspiratory effort  ABDOMEN: Soft, nontender, non-distended, +bowel sounds  EXTREMITIES: No edema b/l, no cyanosis   SKIN: warm and dry  NEURO: Alert/oriented x 3  PSYCH: normal affect.        LABS:                        12.1   9.42  )-----------( 253      ( 27 Dec 2023 17:38 )             35.1     12-27    132<L>  |  96  |  24.2<H>  ----------------------------<  106<H>  4.4   |  25.0  |  1.25    Ca    8.7      27 Dec 2023 17:38    TPro  6.5<L>  /  Alb  2.9<L>  /  TBili  0.3<L>  /  DBili  x   /  AST  24  /  ALT  9   /  AlkPhos  129<H>  12-27    ANN JULIANO      PT/INR - ( 27 Dec 2023 17:38 )   PT: 11.5 sec;   INR: 1.04 ratio         PTT - ( 27 Dec 2023 17:38 )  PTT:30.3 sec  Urinalysis Basic - ( 27 Dec 2023 17:38 )    Color: x / Appearance: x / SG: x / pH: x  Gluc: 106 mg/dL / Ketone: x  / Bili: x / Urobili: x   Blood: x / Protein: x / Nitrite: x   Leuk Esterase: x / RBC: x / WBC x   Sq Epi: x / Non Sq Epi: x / Bacteria: x        RADIOLOGY & ADDITIONAL STUDIES:    INTERPRETATION OF TELEMETRY (personally reviewed):    ECG (personally reviewd): Sinus rhythm, PAC's    ECHO: 1/2023  Summary:  1. Left ventricle: The cavity size is normal. There are no regional wall motion abnormalities present. Left ventricular  ejection fraction is 60-65%. There is evidence of Grade 2 diastolic dysfunction.  2. Right ventricle: The cavity size is normal. Right ventricular systolic function is normal. The RV pressure during systole  by Doppler is 35 mm Hg.  3. Left atrium: The cavity size is mildly dilated.  4. Mitral valve: The annulus is calcified. The leaflets are mildly thickened. There is moderate (2+) mitral valve  regurgitation , with multiple jets.  5. Aortic valve: The valve is trileaflet. The leaflets are mildly calcified. There is evidence of aortic sclerosis, but no  evidence of stenosis. There is mild (1+) valvular aortic regurgitation. The peak systolic velocity is 152 cm/sec. The  peak systolic gradient is 9.0 mm Hg. The mean systolic gradient is 5.0 mm Hg. The regurgitation pressure half-time is  597 ms.  6. Tricuspid valve: The tricuspid leaflets are not thickened. There is mild (1+) tricuspid valve regurgitation.  7. Pericardium, extracardiac: There is no pericardial effusion.    Assessment and Plan:  89 year old female with a PMHx of HTN, HLD, chronic HFpEF, lymphedema, afib who presents with near syncope.    Near syncope  -echo from 1/2023 as above  -ECG with sinus rhythm, PAC's  -tele  -will send patient an MCOT to patient's home    Thank you for letting [facility] to assist in the management of this patient. Please call with any questions.                 [facility], THE HEART CENTER                                   [address] [place] [address]                                                      PHONE: [phone]                                                         FAX: [phone]  [email or web address]  ---------------------------------------------------------------------------------------------------------------------------------    Reason for Consult: Near syncope    HPI:  89 year old female with a PMHx of HTN, HLD, chronic HFpEF, lymphedema, afib who presents with near syncope.      PAST MEDICAL & SURGICAL HISTORY:  Glaucoma      Hyperlipemia      Hypertension      Colitis      Edema      Cataract of left eye          lactose (Other)  ACE inhibitors (Angioedema; Blisters; Anaphylaxis)  Benzalkonium Chloride (Unknown)      MEDICATIONS  (STANDING):  aspirin  chewable 81 milliGRAM(s) Oral daily  buMETAnide 2 milliGRAM(s) Oral daily  metoprolol succinate ER 50 milliGRAM(s) Oral daily  spironolactone 25 milliGRAM(s) Oral daily    MEDICATIONS  (PRN):      Social History:  Cigarettes:  Denies current tobacco use                  Alcohol:  Denies daily etoh            Illicit Drug Abuse:  Denies    Family History:  denies CAD, MI, CVA, or sudden death.    ROS: Negative other than as mentioned in HPI.    Vital Signs Last 24 Hrs  T(C): 36.8 (28 Dec 2023 07:48), Max: 37.2 (27 Dec 2023 19:51)  T(F): 98.3 (28 Dec 2023 07:48), Max: 98.9 (27 Dec 2023 19:51)  HR: 69 (28 Dec 2023 07:48) (69 - 98)  BP: 93/55 (28 Dec 2023 07:48) (93/55 - 147/80)  BP(mean): --  RR: 18 (28 Dec 2023 07:48) (18 - 20)  SpO2: 97% (28 Dec 2023 07:48) (94% - 98%)    Parameters below as of 28 Dec 2023 06:09  Patient On (Oxygen Delivery Method): room air      ICU Vital Signs Last 24 Hrs  ANN HENAO  I&O's Detail    I&O's Summary    Drug Dosing Weight  ANN HENAO      PHYSICAL EXAM:  General: NAD  HEENT: Head; normocephalic, atraumatic.  Eyes: EOMI, conjunctiva normal  Neck: Supple, no JVD noted  CARDIOVASCULAR: RRR, S1 S2, No murmurs or gallops  LUNGS: Clear to auscultation b/l, No rales, rhonchi or wheeze, normal inspiratory effort  ABDOMEN: Soft, nontender, non-distended, +bowel sounds  EXTREMITIES: No edema b/l, no cyanosis   SKIN: warm and dry  NEURO: Alert/oriented x 3  PSYCH: normal affect.        LABS:                        12.1   9.42  )-----------( 253      ( 27 Dec 2023 17:38 )             35.1     12-27    132<L>  |  96  |  24.2<H>  ----------------------------<  106<H>  4.4   |  25.0  |  1.25    Ca    8.7      27 Dec 2023 17:38    TPro  6.5<L>  /  Alb  2.9<L>  /  TBili  0.3<L>  /  DBili  x   /  AST  24  /  ALT  9   /  AlkPhos  129<H>  12-27    ANN JULIANO      PT/INR - ( 27 Dec 2023 17:38 )   PT: 11.5 sec;   INR: 1.04 ratio         PTT - ( 27 Dec 2023 17:38 )  PTT:30.3 sec  Urinalysis Basic - ( 27 Dec 2023 17:38 )    Color: x / Appearance: x / SG: x / pH: x  Gluc: 106 mg/dL / Ketone: x  / Bili: x / Urobili: x   Blood: x / Protein: x / Nitrite: x   Leuk Esterase: x / RBC: x / WBC x   Sq Epi: x / Non Sq Epi: x / Bacteria: x        RADIOLOGY & ADDITIONAL STUDIES:    INTERPRETATION OF TELEMETRY (personally reviewed):    ECG (personally reviewd): Sinus rhythm, PAC's    ECHO: 1/2023  Summary:  1. Left ventricle: The cavity size is normal. There are no regional wall motion abnormalities present. Left ventricular  ejection fraction is 60-65%. There is evidence of Grade 2 diastolic dysfunction.  2. Right ventricle: The cavity size is normal. Right ventricular systolic function is normal. The RV pressure during systole  by Doppler is 35 mm Hg.  3. Left atrium: The cavity size is mildly dilated.  4. Mitral valve: The annulus is calcified. The leaflets are mildly thickened. There is moderate (2+) mitral valve  regurgitation , with multiple jets.  5. Aortic valve: The valve is trileaflet. The leaflets are mildly calcified. There is evidence of aortic sclerosis, but no  evidence of stenosis. There is mild (1+) valvular aortic regurgitation. The peak systolic velocity is 152 cm/sec. The  peak systolic gradient is 9.0 mm Hg. The mean systolic gradient is 5.0 mm Hg. The regurgitation pressure half-time is  597 ms.  6. Tricuspid valve: The tricuspid leaflets are not thickened. There is mild (1+) tricuspid valve regurgitation.  7. Pericardium, extracardiac: There is no pericardial effusion.    Assessment and Plan:  89 year old female with a PMHx of HTN, HLD, chronic HFpEF, lymphedema, afib who presents with near syncope.    Near syncope  -echo from 1/2023 as above  -ECG with sinus rhythm, PAC's  -tele  -will send patient an MCOT to patient's home    Thank you for letting [facility] to assist in the management of this patient. Please call with any questions.                 [facility], THE [facility] C[facility]                                   [address] [place] [address]                                                      PHONE: [phone]                                                         FAX: [phone]  [email or web address]  ---------------------------------------------------------------------------------------------------------------------------------    Reason for Consult: Near syncope    HPI:  89 year old female with a PMHx of HTN, HLD, chronic HFpEF, lymphedema, afib who presents with near syncope. Patient states that she was walking with her walker at home when all of a sudden her vision got dark and she felt dizzy. She states that she fell to the ground. She does not remember if she lost consciousness or not. The patient denies any chest pain, SOB, palpitations, fever, chills. Patient came to the hospital. Cardiology is consulted for further evaluation.      PAST MEDICAL & SURGICAL HISTORY:  Glaucoma      Hyperlipemia      Hypertension      Colitis      Edema      Cataract of left eye          lactose (Other)  ACE inhibitors (Angioedema; Blisters; Anaphylaxis)  Benzalkonium Chloride (Unknown)      MEDICATIONS  (STANDING):  aspirin  chewable 81 milliGRAM(s) Oral daily  buMETAnide 2 milliGRAM(s) Oral daily  metoprolol succinate ER 50 milliGRAM(s) Oral daily  spironolactone 25 milliGRAM(s) Oral daily    MEDICATIONS  (PRN):      Social History:  Cigarettes:  Denies current tobacco use                  Alcohol:  Denies daily etoh            Illicit Drug Abuse:  Denies    Family History:  denies sudden death.    ROS: Negative other than as mentioned in HPI.    Vital Signs Last 24 Hrs  T(C): 36.8 (28 Dec 2023 07:48), Max: 37.2 (27 Dec 2023 19:51)  T(F): 98.3 (28 Dec 2023 07:48), Max: 98.9 (27 Dec 2023 19:51)  HR: 69 (28 Dec 2023 07:48) (69 - 98)  BP: 93/55 (28 Dec 2023 07:48) (93/55 - 147/80)  BP(mean): --  RR: 18 (28 Dec 2023 07:48) (18 - 20)  SpO2: 97% (28 Dec 2023 07:48) (94% - 98%)    Parameters below as of 28 Dec 2023 06:09  Patient On (Oxygen Delivery Method): room air      ICU Vital Signs Last 24 Hrs  ANN HENAO  I&O's Detail    I&O's Summary    Drug Dosing Weight  ANN JULIANO      PHYSICAL EXAM:  General: NAD  HEENT: Head; normocephalic, atraumatic.  Eyes: EOMI, conjunctiva normal  Neck: Supple, no JVD noted  CARDIOVASCULAR: Irregularly irregular rhythm, regular rate, S1 S2, No murmurs or gallops  LUNGS: Clear to auscultation b/l, No rales, rhonchi or wheeze, normal inspiratory effort  ABDOMEN: Soft, nontender, non-distended, +bowel sounds  EXTREMITIES: No edema b/l, no cyanosis   SKIN: warm and dry  NEURO: Alert/oriented x 3  PSYCH: normal affect.        LABS:                        12.1   9.42  )-----------( 253      ( 27 Dec 2023 17:38 )             35.1     12-27    132<L>  |  96  |  24.2<H>  ----------------------------<  106<H>  4.4   |  25.0  |  1.25    Ca    8.7      27 Dec 2023 17:38    TPro  6.5<L>  /  Alb  2.9<L>  /  TBili  0.3<L>  /  DBili  x   /  AST  24  /  ALT  9   /  AlkPhos  129<H>  12-27    ANN HENAO      PT/INR - ( 27 Dec 2023 17:38 )   PT: 11.5 sec;   INR: 1.04 ratio         PTT - ( 27 Dec 2023 17:38 )  PTT:30.3 sec  Urinalysis Basic - ( 27 Dec 2023 17:38 )    Color: x / Appearance: x / SG: x / pH: x  Gluc: 106 mg/dL / Ketone: x  / Bili: x / Urobili: x   Blood: x / Protein: x / Nitrite: x   Leuk Esterase: x / RBC: x / WBC x   Sq Epi: x / Non Sq Epi: x / Bacteria: x        RADIOLOGY & ADDITIONAL STUDIES:    INTERPRETATION OF TELEMETRY (personally reviewed): Afib, rate controlled, no other cardiac events     ECG (personally reviewd): Sinus rhythm, PAC's    ECHO: 1/2023  Summary:  1. Left ventricle: The cavity size is normal. There are no regional wall motion abnormalities present. Left ventricular  ejection fraction is 60-65%. There is evidence of Grade 2 diastolic dysfunction.  2. Right ventricle: The cavity size is normal. Right ventricular systolic function is normal. The RV pressure during systole  by Doppler is 35 mm Hg.  3. Left atrium: The cavity size is mildly dilated.  4. Mitral valve: The annulus is calcified. The leaflets are mildly thickened. There is moderate (2+) mitral valve  regurgitation , with multiple jets.  5. Aortic valve: The valve is trileaflet. The leaflets are mildly calcified. There is evidence of aortic sclerosis, but no  evidence of stenosis. There is mild (1+) valvular aortic regurgitation. The peak systolic velocity is 152 cm/sec. The  peak systolic gradient is 9.0 mm Hg. The mean systolic gradient is 5.0 mm Hg. The regurgitation pressure half-time is  597 ms.  6. Tricuspid valve: The tricuspid leaflets are not thickened. There is mild (1+) tricuspid valve regurgitation.  7. Pericardium, extracardiac: There is no pericardial effusion.    Assessment and Plan:  89 year old female with a PMHx of HTN, HLD, chronic HFpEF, lymphedema, afib who presents with near syncope.    Near syncope  -echo from 1/2023 as above  -ECG with sinus rhythm, PAC's  -tele without significant events  -will send patient an MCOT to patient's home    No further inpatient cardiac testing required.    Thank you for letting [facility] to assist in the management of this patient. Please call with any questions.                 [facility], THE HEART CENTER                                   [address] [place] [address]                                                      PHONE: [phone]                                                         FAX: [phone]  [email or web address]  ---------------------------------------------------------------------------------------------------------------------------------    Reason for Consult: Near syncope    HPI:  89 year old female with a PMHx of HTN, HLD, chronic HFpEF, lymphedema, afib who presents with near syncope. Patient states that she was walking with her walker at home when all of a sudden her vision got dark and she felt dizzy. She states that she fell to the ground. She does not remember if she lost consciousness or not. The patient denies any chest pain, SOB, palpitations, fever, chills. Patient came to the hospital. Cardiology is consulted for further evaluation.      PAST MEDICAL & SURGICAL HISTORY:  Glaucoma      Hyperlipemia      Hypertension      Colitis      Edema      Cataract of left eye          lactose (Other)  ACE inhibitors (Angioedema; Blisters; Anaphylaxis)  Benzalkonium Chloride (Unknown)      MEDICATIONS  (STANDING):  aspirin  chewable 81 milliGRAM(s) Oral daily  buMETAnide 2 milliGRAM(s) Oral daily  metoprolol succinate ER 50 milliGRAM(s) Oral daily  spironolactone 25 milliGRAM(s) Oral daily    MEDICATIONS  (PRN):      Social History:  Cigarettes:  Denies current tobacco use                  Alcohol:  Denies daily etoh            Illicit Drug Abuse:  Denies    Family History:  denies sudden death.    ROS: Negative other than as mentioned in HPI.    Vital Signs Last 24 Hrs  T(C): 36.8 (28 Dec 2023 07:48), Max: 37.2 (27 Dec 2023 19:51)  T(F): 98.3 (28 Dec 2023 07:48), Max: 98.9 (27 Dec 2023 19:51)  HR: 69 (28 Dec 2023 07:48) (69 - 98)  BP: 93/55 (28 Dec 2023 07:48) (93/55 - 147/80)  BP(mean): --  RR: 18 (28 Dec 2023 07:48) (18 - 20)  SpO2: 97% (28 Dec 2023 07:48) (94% - 98%)    Parameters below as of 28 Dec 2023 06:09  Patient On (Oxygen Delivery Method): room air      ICU Vital Signs Last 24 Hrs  ANN HENAO  I&O's Detail    I&O's Summary    Drug Dosing Weight  ANN JULIANO      PHYSICAL EXAM:  General: NAD  HEENT: Head; normocephalic, atraumatic.  Eyes: EOMI, conjunctiva normal  Neck: Supple, no JVD noted  CARDIOVASCULAR: Irregularly irregular rhythm, regular rate, S1 S2, No murmurs or gallops  LUNGS: Clear to auscultation b/l, No rales, rhonchi or wheeze, normal inspiratory effort  ABDOMEN: Soft, nontender, non-distended, +bowel sounds  EXTREMITIES: No edema b/l, no cyanosis   SKIN: warm and dry  NEURO: Alert/oriented x 3  PSYCH: normal affect.        LABS:                        12.1   9.42  )-----------( 253      ( 27 Dec 2023 17:38 )             35.1     12-27    132<L>  |  96  |  24.2<H>  ----------------------------<  106<H>  4.4   |  25.0  |  1.25    Ca    8.7      27 Dec 2023 17:38    TPro  6.5<L>  /  Alb  2.9<L>  /  TBili  0.3<L>  /  DBili  x   /  AST  24  /  ALT  9   /  AlkPhos  129<H>  12-27    ANN HENAO      PT/INR - ( 27 Dec 2023 17:38 )   PT: 11.5 sec;   INR: 1.04 ratio         PTT - ( 27 Dec 2023 17:38 )  PTT:30.3 sec  Urinalysis Basic - ( 27 Dec 2023 17:38 )    Color: x / Appearance: x / SG: x / pH: x  Gluc: 106 mg/dL / Ketone: x  / Bili: x / Urobili: x   Blood: x / Protein: x / Nitrite: x   Leuk Esterase: x / RBC: x / WBC x   Sq Epi: x / Non Sq Epi: x / Bacteria: x        RADIOLOGY & ADDITIONAL STUDIES:    INTERPRETATION OF TELEMETRY (personally reviewed): Afib, rate controlled, no other cardiac events     ECG (personally reviewd): Sinus rhythm, PAC's    ECHO: 1/2023  Summary:  1. Left ventricle: The cavity size is normal. There are no regional wall motion abnormalities present. Left ventricular  ejection fraction is 60-65%. There is evidence of Grade 2 diastolic dysfunction.  2. Right ventricle: The cavity size is normal. Right ventricular systolic function is normal. The RV pressure during systole  by Doppler is 35 mm Hg.  3. Left atrium: The cavity size is mildly dilated.  4. Mitral valve: The annulus is calcified. The leaflets are mildly thickened. There is moderate (2+) mitral valve  regurgitation , with multiple jets.  5. Aortic valve: The valve is trileaflet. The leaflets are mildly calcified. There is evidence of aortic sclerosis, but no  evidence of stenosis. There is mild (1+) valvular aortic regurgitation. The peak systolic velocity is 152 cm/sec. The  peak systolic gradient is 9.0 mm Hg. The mean systolic gradient is 5.0 mm Hg. The regurgitation pressure half-time is  597 ms.  6. Tricuspid valve: The tricuspid leaflets are not thickened. There is mild (1+) tricuspid valve regurgitation.  7. Pericardium, extracardiac: There is no pericardial effusion.    Assessment and Plan:  89 year old female with a PMHx of HTN, HLD, chronic HFpEF, lymphedema, afib who presents with near syncope.    Near syncope  -echo from 1/2023 as above  -ECG with sinus rhythm, PAC's  -tele without significant events  -will send patient an MCOT to patient's home    No further inpatient cardiac testing required.    Thank you for letting [facility] to assist in the management of this patient. Please call with any questions.

## 2023-12-28 NOTE — PHYSICAL THERAPY INITIAL EVALUATION ADULT - PERTINENT HX OF CURRENT PROBLEM, REHAB EVAL
as per medical chart: pt was walking to the bathroom,  she felt very weak and lightheaded, she sat down but then proceeded to fall out of the chair and hit her head, she doesn't think she passed out completely.
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
## 2023-12-28 NOTE — ED CDU PROVIDER SUBSEQUENT DAY NOTE - PROGRESS NOTE DETAILS
at the time pt was seen by PT . pt felt lightheaded again - sat done   added Echo and sent the massage through the team for the cardiology for new finding. will trend echo as well . BP is on softer side- request the  call back cardiology request the  call back cardiology  Spoke with cardiologist Dr funez Told about the finding includes lightheadedness again as patient walked with the PT,  and positive orthostatic blood pressure. recommended gentle hydration repeat orthostatic. when the nurse ever continue monitor the patient

## 2023-12-28 NOTE — ED ADULT NURSE REASSESSMENT NOTE - NS ED NURSE REASSESS COMMENT FT1
Assumed care of patient at 15:30 from RN EULALIO Sibley. Charting as noted. Patient AA&Ox4, resp even/unlabored, MAEx4, skin warm, dry, intact, presents to ED c/o near syncopal episode. At time of assessment, patient denies pain/discomfort, denies CP/SOB, denies any further complaints or physical symptoms. Patient updated on the plan of care, awaiting cardiology consult and PT eval. Stretcher locked in lowest position. Pt placed on CM, pt in NSR, rate 68, SpO2 97% on room air. No signs of acute distress noted, safety maintained, son at bed side.

## 2023-12-28 NOTE — PHYSICAL THERAPY INITIAL EVALUATION ADULT - NSPTDISCHREC_GEN_A_CORE
rehab disposition recommendation may be change base on patient  functional progress and social support/Home PT

## 2023-12-28 NOTE — PHYSICAL THERAPY INITIAL EVALUATION ADULT - ADDITIONAL COMMENTS
as per pt: resides in the apartment with no steps to enter, no step son the inside, ambulates with rollator, denies hx of falling in the past 6 months but the one that brought her to ED, Family available to assist

## 2023-12-28 NOTE — ED ADULT NURSE REASSESSMENT NOTE - NSFALLHARMRISKINTERV_ED_ALL_ED
Assistance OOB with selected safe patient handling equipment if applicable/Assistance with ambulation/Communicate risk of Fall with Harm to all staff, patient, and family/Monitor gait and stability/Provide patient with walking aids/Provide visual cue: red socks, yellow wristband, yellow gown, etc/Reinforce activity limits and safety measures with patient and family/Bed in lowest position, wheels locked, appropriate side rails in place/Call bell, personal items and telephone in reach/Instruct patient to call for assistance before getting out of bed/chair/stretcher/Non-slip footwear applied when patient is off stretcher/[place] to call system/Physically safe environment - no spills, clutter or unnecessary equipment/Purposeful Proactive Rounding/Room/bathroom lighting operational, light cord in reach Assistance OOB with selected safe patient handling equipment if applicable/Assistance with ambulation/Communicate risk of Fall with Harm to all staff, patient, and family/Monitor gait and stability/Provide patient with walking aids/Provide visual cue: red socks, yellow wristband, yellow gown, etc/Reinforce activity limits and safety measures with patient and family/Bed in lowest position, wheels locked, appropriate side rails in place/Call bell, personal items and telephone in reach/Instruct patient to call for assistance before getting out of bed/chair/stretcher/Non-slip footwear applied when patient is off stretcher/[place] to call system/Physically safe environment - no spills, clutter or unnecessary equipment/Purposeful Proactive Rounding/Room/bathroom lighting operational, light cord in reach

## 2023-12-28 NOTE — ED ADULT NURSE REASSESSMENT NOTE - NS ED NURSE REASSESS COMMENT FT1
Patient AA&Ox4, no acute distress, resp nonlabored, resting comfortably in bed. Denies headache, dizziness, chest pain, palpitations, SOB, abd pain, n/v/d, urinary symptoms, fevers, chills, weakness at this time.

## 2023-12-28 NOTE — PROVIDER CONTACT NOTE (OTHER) - ASSESSMENT
Pt's chart reviewed, content noted initial eval completed, refer to it for details about pt's functional status. Received on the stretchers, lines intact and active, precautions maintained and reinforced, left sitting in the chair in RN care,  no pain reported 0/10 pre, during and post session, NAD, RN and pt in agreement to actively participates in PT evaluation, RN made aware of the evaluation outcome, PT will follow.

## 2023-12-28 NOTE — ED ADULT NURSE REASSESSMENT NOTE - NS ED NURSE REASSESS COMMENT FT1
[facility]  Pt AxO4, VSS.  Pt denies chest pain/SOB at this time.  Cardio NSR on cardiac monitor.   IV insertion site  --intact   flushing without difficulty. Denies chest pain. Po fluid encouraged tolerated well Rt hip swollen with ecchymosis area  Bilateral knees with ecchymosis area Safety measures taken, bed in low position, call bell within reach, side rails up x2.  Plan of care explained.  Pt verbalized understanding.  Will continue to monitor. Received patient from days\[facility]\"" RN.  Pt AxO4, VSS.  Pt denies chest pain/SOB at this time.  Cardio NSR on cardiac monitor.   IV insertion site  --intact   flushing without difficulty. Denies chest pain. Po fluid encouraged tolerated well Rt hip swollen with ecchymosis area  Bilateral knees with ecchymosis area Safety measures taken, bed in low position, call bell within reach, side rails up x2.  Plan of care explained.  Pt verbalized understanding.  Will continue to monitor.

## 2023-12-28 NOTE — PROVIDER CONTACT NOTE (OTHER) - RECOMMENDATIONS
Home with physical therapy services, use of the rollator / rehab disposition recommendation may be change base on patient  functional progress and social support

## 2023-12-28 NOTE — ED ADULT NURSE REASSESSMENT NOTE - NS ED NURSE REASSESS COMMENT FT1
While walking with PT became lightheaded and per Physical Therapist pt was minimally responsive. FS done and MD Rivers called to bedside, rpt labs to be drawn and EKG.   Pt alert and oriented, states she feel "okay, I don't know what happened" Repair Type: Complex Repair

## 2023-12-29 VITALS
HEART RATE: 79 BPM | RESPIRATION RATE: 18 BRPM | DIASTOLIC BLOOD PRESSURE: 74 MMHG | TEMPERATURE: 99 F | SYSTOLIC BLOOD PRESSURE: 127 MMHG | OXYGEN SATURATION: 96 %

## 2023-12-29 LAB
ANION GAP SERPL CALC-SCNC: 11 MMOL/L — SIGNIFICANT CHANGE UP (ref 5–17)
ANION GAP SERPL CALC-SCNC: 11 MMOL/L — SIGNIFICANT CHANGE UP (ref 5–17)
BASOPHILS # BLD AUTO: 0.05 K/UL — SIGNIFICANT CHANGE UP (ref 0–0.2)
BASOPHILS # BLD AUTO: 0.05 K/UL — SIGNIFICANT CHANGE UP (ref 0–0.2)
BASOPHILS NFR BLD AUTO: 0.7 % — SIGNIFICANT CHANGE UP (ref 0–2)
BASOPHILS NFR BLD AUTO: 0.7 % — SIGNIFICANT CHANGE UP (ref 0–2)
BUN SERPL-MCNC: 25.7 MG/DL — HIGH (ref 8–20)
BUN SERPL-MCNC: 25.7 MG/DL — HIGH (ref 8–20)
CALCIUM SERPL-MCNC: 8.5 MG/DL — SIGNIFICANT CHANGE UP (ref 8.4–10.5)
CALCIUM SERPL-MCNC: 8.5 MG/DL — SIGNIFICANT CHANGE UP (ref 8.4–10.5)
CHLORIDE SERPL-SCNC: 100 MMOL/L — SIGNIFICANT CHANGE UP (ref 96–108)
CHLORIDE SERPL-SCNC: 100 MMOL/L — SIGNIFICANT CHANGE UP (ref 96–108)
CO2 SERPL-SCNC: 26 MMOL/L — SIGNIFICANT CHANGE UP (ref 22–29)
CO2 SERPL-SCNC: 26 MMOL/L — SIGNIFICANT CHANGE UP (ref 22–29)
CREAT SERPL-MCNC: 1.23 MG/DL — SIGNIFICANT CHANGE UP (ref 0.5–1.3)
CREAT SERPL-MCNC: 1.23 MG/DL — SIGNIFICANT CHANGE UP (ref 0.5–1.3)
EGFR: 42 ML/MIN/1.73M2 — LOW
EGFR: 42 ML/MIN/1.73M2 — LOW
EOSINOPHIL # BLD AUTO: 0.18 K/UL — SIGNIFICANT CHANGE UP (ref 0–0.5)
EOSINOPHIL # BLD AUTO: 0.18 K/UL — SIGNIFICANT CHANGE UP (ref 0–0.5)
EOSINOPHIL NFR BLD AUTO: 2.6 % — SIGNIFICANT CHANGE UP (ref 0–6)
EOSINOPHIL NFR BLD AUTO: 2.6 % — SIGNIFICANT CHANGE UP (ref 0–6)
GLUCOSE SERPL-MCNC: 88 MG/DL — SIGNIFICANT CHANGE UP (ref 70–99)
GLUCOSE SERPL-MCNC: 88 MG/DL — SIGNIFICANT CHANGE UP (ref 70–99)
HCT VFR BLD CALC: 28.7 % — LOW (ref 34.5–45)
HCT VFR BLD CALC: 28.7 % — LOW (ref 34.5–45)
HGB BLD-MCNC: 10.2 G/DL — LOW (ref 11.5–15.5)
HGB BLD-MCNC: 10.2 G/DL — LOW (ref 11.5–15.5)
IMM GRANULOCYTES NFR BLD AUTO: 0.4 % — SIGNIFICANT CHANGE UP (ref 0–0.9)
IMM GRANULOCYTES NFR BLD AUTO: 0.4 % — SIGNIFICANT CHANGE UP (ref 0–0.9)
LYMPHOCYTES # BLD AUTO: 1.12 K/UL — SIGNIFICANT CHANGE UP (ref 1–3.3)
LYMPHOCYTES # BLD AUTO: 1.12 K/UL — SIGNIFICANT CHANGE UP (ref 1–3.3)
LYMPHOCYTES # BLD AUTO: 16.1 % — SIGNIFICANT CHANGE UP (ref 13–44)
LYMPHOCYTES # BLD AUTO: 16.1 % — SIGNIFICANT CHANGE UP (ref 13–44)
MAGNESIUM SERPL-MCNC: 2.1 MG/DL — SIGNIFICANT CHANGE UP (ref 1.6–2.6)
MAGNESIUM SERPL-MCNC: 2.1 MG/DL — SIGNIFICANT CHANGE UP (ref 1.6–2.6)
MCHC RBC-ENTMCNC: 30.6 PG — SIGNIFICANT CHANGE UP (ref 27–34)
MCHC RBC-ENTMCNC: 30.6 PG — SIGNIFICANT CHANGE UP (ref 27–34)
MCHC RBC-ENTMCNC: 35.5 GM/DL — SIGNIFICANT CHANGE UP (ref 32–36)
MCHC RBC-ENTMCNC: 35.5 GM/DL — SIGNIFICANT CHANGE UP (ref 32–36)
MCV RBC AUTO: 86.2 FL — SIGNIFICANT CHANGE UP (ref 80–100)
MCV RBC AUTO: 86.2 FL — SIGNIFICANT CHANGE UP (ref 80–100)
MONOCYTES # BLD AUTO: 0.8 K/UL — SIGNIFICANT CHANGE UP (ref 0–0.9)
MONOCYTES # BLD AUTO: 0.8 K/UL — SIGNIFICANT CHANGE UP (ref 0–0.9)
MONOCYTES NFR BLD AUTO: 11.5 % — SIGNIFICANT CHANGE UP (ref 2–14)
MONOCYTES NFR BLD AUTO: 11.5 % — SIGNIFICANT CHANGE UP (ref 2–14)
NEUTROPHILS # BLD AUTO: 4.79 K/UL — SIGNIFICANT CHANGE UP (ref 1.8–7.4)
NEUTROPHILS # BLD AUTO: 4.79 K/UL — SIGNIFICANT CHANGE UP (ref 1.8–7.4)
NEUTROPHILS NFR BLD AUTO: 68.7 % — SIGNIFICANT CHANGE UP (ref 43–77)
NEUTROPHILS NFR BLD AUTO: 68.7 % — SIGNIFICANT CHANGE UP (ref 43–77)
PLATELET # BLD AUTO: 236 K/UL — SIGNIFICANT CHANGE UP (ref 150–400)
PLATELET # BLD AUTO: 236 K/UL — SIGNIFICANT CHANGE UP (ref 150–400)
POTASSIUM SERPL-MCNC: 3.6 MMOL/L — SIGNIFICANT CHANGE UP (ref 3.5–5.3)
POTASSIUM SERPL-MCNC: 3.6 MMOL/L — SIGNIFICANT CHANGE UP (ref 3.5–5.3)
POTASSIUM SERPL-SCNC: 3.6 MMOL/L — SIGNIFICANT CHANGE UP (ref 3.5–5.3)
POTASSIUM SERPL-SCNC: 3.6 MMOL/L — SIGNIFICANT CHANGE UP (ref 3.5–5.3)
RBC # BLD: 3.33 M/UL — LOW (ref 3.8–5.2)
RBC # BLD: 3.33 M/UL — LOW (ref 3.8–5.2)
RBC # FLD: 12.9 % — SIGNIFICANT CHANGE UP (ref 10.3–14.5)
RBC # FLD: 12.9 % — SIGNIFICANT CHANGE UP (ref 10.3–14.5)
SODIUM SERPL-SCNC: 137 MMOL/L — SIGNIFICANT CHANGE UP (ref 135–145)
SODIUM SERPL-SCNC: 137 MMOL/L — SIGNIFICANT CHANGE UP (ref 135–145)
WBC # BLD: 6.97 K/UL — SIGNIFICANT CHANGE UP (ref 3.8–10.5)
WBC # BLD: 6.97 K/UL — SIGNIFICANT CHANGE UP (ref 3.8–10.5)
WBC # FLD AUTO: 6.97 K/UL — SIGNIFICANT CHANGE UP (ref 3.8–10.5)
WBC # FLD AUTO: 6.97 K/UL — SIGNIFICANT CHANGE UP (ref 3.8–10.5)

## 2023-12-29 PROCEDURE — 72125 CT NECK SPINE W/O DYE: CPT | Mod: MG

## 2023-12-29 PROCEDURE — 85610 PROTHROMBIN TIME: CPT

## 2023-12-29 PROCEDURE — 36415 COLL VENOUS BLD VENIPUNCTURE: CPT

## 2023-12-29 PROCEDURE — G0378: CPT

## 2023-12-29 PROCEDURE — 71045 X-RAY EXAM CHEST 1 VIEW: CPT

## 2023-12-29 PROCEDURE — 83735 ASSAY OF MAGNESIUM: CPT

## 2023-12-29 PROCEDURE — 70450 CT HEAD/BRAIN W/O DYE: CPT | Mod: MG

## 2023-12-29 PROCEDURE — 93005 ELECTROCARDIOGRAM TRACING: CPT

## 2023-12-29 PROCEDURE — 84484 ASSAY OF TROPONIN QUANT: CPT

## 2023-12-29 PROCEDURE — 80048 BASIC METABOLIC PNL TOTAL CA: CPT

## 2023-12-29 PROCEDURE — 93306 TTE W/DOPPLER COMPLETE: CPT

## 2023-12-29 PROCEDURE — 80053 COMPREHEN METABOLIC PANEL: CPT

## 2023-12-29 PROCEDURE — 99285 EMERGENCY DEPT VISIT HI MDM: CPT | Mod: 25

## 2023-12-29 PROCEDURE — 99239 HOSP IP/OBS DSCHRG MGMT >30: CPT

## 2023-12-29 PROCEDURE — G1004: CPT

## 2023-12-29 PROCEDURE — 73502 X-RAY EXAM HIP UNI 2-3 VIEWS: CPT

## 2023-12-29 PROCEDURE — 85025 COMPLETE CBC W/AUTO DIFF WBC: CPT

## 2023-12-29 PROCEDURE — 82962 GLUCOSE BLOOD TEST: CPT

## 2023-12-29 PROCEDURE — 85027 COMPLETE CBC AUTOMATED: CPT

## 2023-12-29 PROCEDURE — 73564 X-RAY EXAM KNEE 4 OR MORE: CPT

## 2023-12-29 PROCEDURE — 85730 THROMBOPLASTIN TIME PARTIAL: CPT

## 2023-12-29 RX ORDER — METOPROLOL TARTRATE 50 MG
1 TABLET ORAL
Qty: 30 | Refills: 0
Start: 2023-12-29

## 2023-12-29 RX ADMIN — BUMETANIDE 2 MILLIGRAM(S): 0.25 INJECTION INTRAMUSCULAR; INTRAVENOUS at 06:36

## 2023-12-29 RX ADMIN — SPIRONOLACTONE 25 MILLIGRAM(S): 25 TABLET, FILM COATED ORAL at 06:35

## 2023-12-29 RX ADMIN — Medication 50 MILLIGRAM(S): at 06:34

## 2023-12-29 NOTE — ED CDU PROVIDER SUBSEQUENT DAY NOTE - ATTENDING APP SHARED VISIT CONTRIBUTION OF CARE
Seen on rounds.  Reports no complaints at present.  History reviewed.  Labs, orthostatic vital signs and EKG findings reviewed.  No alarms captured on telemetry monitoring: Mostly sinus bradycardia throughout the night.  Echo completed.  Patient reports pre-existing follow-up appointment with [place] cardiology on 1/24. Advised reduction of metoprolol to 25 mg daily and follow-up with PMD or cardiology as soon as possible Seen on rounds.  Reports no complaints at present.  History reviewed.  Labs, orthostatic vital signs and EKG findings reviewed.  No alarms captured on telemetry monitoring: Mostly sinus bradycardia throughout the night.  Echo completed.  Patient reports pre-existing follow-up appointment with [place] cardiology on 1/24. Advised reduction of metoprolol to 25 mg daily and follow-up with PMD or cardiology as soon as possible

## 2023-12-29 NOTE — ED CDU PROVIDER SUBSEQUENT DAY NOTE - HISTORY
Pt resting comfortably at time of re-assessment. No events overnight. Pending reassessment. Will continue to monitor.
Pt resting comfortably at time of re-assessment. No events overnight. Pending PT and cardiology eval. Will continue to monitor.

## 2023-12-29 NOTE — ED CDU PROVIDER DISPOSITION NOTE - CARE PROVIDER_API CALL
Giovany Fox  Cardiology  [address] [place] [id number]  Phone: [phone]  Follow Up Time:    Giovany Fox  Cardiology  [address] [place] [id number]  Phone: [phone]  Follow Up Time:

## 2023-12-29 NOTE — ED CDU PROVIDER SUBSEQUENT DAY NOTE - NS ED ROS FT
No fever/chills   No photophobia/eye pain/changes in visio,   No ear pain/sore throat/dysphagia   No chest pain/palpitations  No SOB/cough/wheeze/stridor   No abdominal pain, No N/V/D  No dysuria/frequency/discharge   No neck/back pain,   No rash  No changes in neurological status/function.
Gen: denies fever, chills, fatigue, weight loss  Skin: denies rashes, laceration, bruising  HEENT: denies visual changes, ear pain, nasal congestion, throat pain  Respiratory: denies BASSETT, SOB, cough, wheezing  Cardiovascular: denies chest pain, palpitations, diaphoresis, LE edema  GI: denies abdominal pain, n/v/d  : denies dysuria, frequency, urgency, bowel/bladder incontinence  MSK: denies joint swelling/pain, back pain, neck pain  Neuro: +Nearsyncope. denies headache, dizziness, weakness, numbness  Psych: denies anxiety, depression, SI/HI, visual/auditory hallucinations

## 2023-12-29 NOTE — ED ADULT NURSE REASSESSMENT NOTE - NS ED NURSE REASSESS COMMENT FT1
Pt received in the stretcher resting comfortably , no distress noted, no chest pain reported. Breathing easy and unlabored. VSS . Pt awaiting   dispo . Will continue to monitor

## 2023-12-29 NOTE — ED CDU PROVIDER SUBSEQUENT DAY NOTE - CLINICAL SUMMARY MEDICAL DECISION MAKING FREE TEXT BOX
89F with pmh HTN, HLD presenting with near syncope v. syncope. labs wnl, EKG non ischemic.  Pt has R hip contusion but no fx on xr.  Pt lives alone and has pain. Placed in obs for cards eval and PT eval in AM.     +orthostatics. pt hydrated, pending reassessment.
89F with pmh HTN, HLD presenting with near syncope v. syncope. labs wnl, EKG non ischemic.  Pt has R hip contusion but no fx on xr.  Pt lives alone and has pain. Placed in obs for cards eval and PT eval in AM.

## 2023-12-29 NOTE — ED CDU PROVIDER SUBSEQUENT DAY NOTE - PHYSICAL EXAMINATION
Gen: No acute distress, non toxic  HEENT: Mucous membranes moist, pink conjunctivae, EOMI  CV: RRR, nl s1/s2.  Resp: CTAB, normal rate and effort  GI: Abdomen soft, NT, ND. No rebound, no guarding  Neuro: A&O x 3, moving all 4 extremities. no fnd's  MSK: No spine or joint tenderness to palpation  Skin: No rashes. intact and perfused.
Constitutional - well-developed.   Head - NCAT. Airway patent.   Eyes - PERRL.   CV - RRR. no murmur. no edema.   Pulm - CTAB.   Abd - soft, nt. no rebound. no guarding.   Neuro - A&Ox3. strength 5/5 x4. sensation intact x4. normal gait.   Skin - No rash. .  MSK - normal ROM. R lateral hip with soft tissue hematoma.

## 2023-12-29 NOTE — CHART NOTE - NSCHARTNOTEFT_GEN_A_CORE
CM met w/ pt at bs and dtr via phone.  Pt and dtr open to [facility]. [facility]  Pt / dtr denies any further CM needs. CM met w/ pt at bs and dtr via phone.  Pt and dtr open to [facility]. [facility]  Pt / dtr denies any further CM needs.

## 2023-12-29 NOTE — ED CDU PROVIDER DISPOSITION NOTE - PATIENT PORTAL LINK FT
You can access the FollowMyHealth Patient Portal offered by [facility] by registering at the following website: [email or web address]. By joining [employer]’s FollowMyHealth portal, you will also be able to view your health information using other applications (apps) compatible with our system. You can access the FollowMyHealth Patient Portal offered by [facility] by registering at the following website: [email or web address]. By joining [employer]’s FollowMyHealth portal, you will also be able to view your health information using other applications (apps) compatible with our system.

## 2023-12-29 NOTE — ED CDU PROVIDER SUBSEQUENT DAY NOTE - WR INTERPRETATION 3
CXR negative - No infiltrates, No consolidation, No atelectasis seen
CXR negative - No infiltrates, No consolidation, No atelectasis seen

## 2023-12-29 NOTE — ED CDU PROVIDER DISPOSITION NOTE - NSFOLLOWUPINSTRUCTIONS_ED_ALL_ED_FT
Syncope    Syncope is when you temporarily lose consciousness, also called fainting or passing out. It is caused by a sudden decrease in blood flow to the brain. Even though most causes of syncope are not dangerous, syncope can possibly be a sign of a serious medical problem. Signs that you may be about to faint include feeling dizzy, lightheaded, nausea, visual changes, or cold/clammy skin. Do not drive, operate heavy machinery, or play sports until your health care provider says it is okay.    SEEK IMMEDIATE MEDICAL CARE IF YOU HAVE ANY OF THE FOLLOWING SYMPTOMS: severe headache, pain in your chest/abdomen/back, bleeding from your mouth or rectum, palpitations, shortness of breath, pain with breathing, seizure, confusion, or trouble walking. STOP taking metoprolol ER 50mg.  START taking metoprolol ER 25mg daily.  Follow-up with your PMD early next week for re-evaluation of your blood pressure.  Follow-up with your cardiologist ASAP for re-evaluation.    Syncope    Syncope is when you temporarily lose consciousness, also called fainting or passing out. It is caused by a sudden decrease in blood flow to the brain. Even though most causes of syncope are not dangerous, syncope can possibly be a sign of a serious medical problem. Signs that you may be about to faint include feeling dizzy, lightheaded, nausea, visual changes, or cold/clammy skin. Do not drive, operate heavy machinery, or play sports until your health care provider says it is okay.    SEEK IMMEDIATE MEDICAL CARE IF YOU HAVE ANY OF THE FOLLOWING SYMPTOMS: severe headache, pain in your chest/abdomen/back, bleeding from your mouth or rectum, palpitations, shortness of breath, pain with breathing, seizure, confusion, or trouble walking.

## 2023-12-29 NOTE — ED CDU PROVIDER DISPOSITION NOTE - CLINICAL COURSE
This is a 89 yo F pmh HTN, HLD presenting with syncope. Pt states that she was at home this evening and she was walking to the bathroom. Pt states that she felt very weak and lightheaded.  Pt states that she sat down but then proceeded to fall out of the chair and hit her head.  Patient asssessed by cardiology, cleared for outpatient follow up.  While in observation, orthostatics performed positive, kept overnight for gentle hydration, BP improved.  Patient stable to go home.  Given copies of all results, understands and agrees to proceed. This is a 88 yo F pmh HTN, HLD presenting with syncope. Pt states that she was at home this evening and she was walking to the bathroom. Pt states that she felt very weak and lightheaded.  Pt states that she sat down but then proceeded to fall out of the chair and hit her head.  Patient asssessed by cardiology, cleared for outpatient follow up.  While in observation, orthostatics performed positive, kept overnight for gentle hydration, BP improved.  Patient stable to go home.  Given copies of all results, understands and agrees to proceed.

## 2023-12-29 NOTE — ED CDU PROVIDER SUBSEQUENT DAY NOTE - NS ED ATTENDING STATEMENT MOD
This was a shared visit with the DAVE. I reviewed and verified the documentation and independently performed the documented:
This was a shared visit with the DAVE. I reviewed and verified the documentation and independently performed the documented:

## 2023-12-29 NOTE — ED CDU PROVIDER DISPOSITION NOTE - ATTENDING APP SHARED VISIT CONTRIBUTION OF CARE
reports no symptoms at present.  h/o prior syncope.  Notes orthostatic lightheadedness in past, especially after taking BP meds (metoprolol 50mg).  Monitoring on CDU unremarkable: no alarms on telemetry monitoring.  ECHO largely unremarkable.  Advised to stop metoprolol 50mg and start metoprolol 25mg.  Follow-up with cardiology ASAP.

## 2023-12-29 NOTE — ED ADULT NURSE REASSESSMENT NOTE - COMFORT CARE
meal provided
assisted to bathroom/plan of care explained/repositioned/treatment delay explained/wait time explained/warm blanket provided
assisted to bathroom/assisted to bedpan/po fluids offered/repositioned

## 2024-04-23 ENCOUNTER — OFFICE (OUTPATIENT)
Dept: URBAN - METROPOLITAN AREA CLINIC 6 | Facility: CLINIC | Age: 89
Setting detail: OPHTHALMOLOGY
End: 2024-04-23
Payer: MEDICARE

## 2024-04-23 DIAGNOSIS — H40.1123: ICD-10-CM

## 2024-04-23 DIAGNOSIS — H40.1111: ICD-10-CM

## 2024-04-23 DIAGNOSIS — H26.492: ICD-10-CM

## 2024-04-23 PROCEDURE — 92083 EXTENDED VISUAL FIELD XM: CPT | Performed by: OPHTHALMOLOGY

## 2024-04-23 PROCEDURE — 99214 OFFICE O/P EST MOD 30 MIN: CPT | Performed by: OPHTHALMOLOGY

## 2024-04-23 PROCEDURE — 92133 CPTRZD OPH DX IMG PST SGM ON: CPT | Performed by: OPHTHALMOLOGY

## 2024-04-23 ASSESSMENT — LID EXAM ASSESSMENTS: OD_ECCHYMOSIS: RUL T

## 2024-06-17 ENCOUNTER — APPOINTMENT (OUTPATIENT)
Dept: VASCULAR SURGERY | Facility: CLINIC | Age: 89
End: 2024-06-17

## 2024-09-20 NOTE — ED ADULT TRIAGE NOTE - BP NONINVASIVE SYSTOLIC (MM HG)
[FreeTextEntry1] : General: healthy appearing, acting appropriate for age.  HEENT: NCAT, Normal conjunctiva Cardio: Appears well perfused, no peripheral edema, brisk cap refill.  Lungs: no obvious increased WOB, no audible wheeze heard without use of stethoscope.  Abdomen: not examined.  Skin: No visible rashes on exposed skin  R hand: No swelling of the 2nd MCP joint.  No ttp over the fracture site Full range of motion of digits Can easily bring fingers into a fist without digits scissoring.  NVI, SILT. Moving digits freely.  WWP distally, brisk cap refill
124

## 2024-12-17 ENCOUNTER — OFFICE (OUTPATIENT)
Dept: URBAN - METROPOLITAN AREA CLINIC 6 | Facility: CLINIC | Age: 89
Setting detail: OPHTHALMOLOGY
End: 2024-12-17
Payer: MEDICARE

## 2024-12-17 DIAGNOSIS — H26.492: ICD-10-CM

## 2024-12-17 DIAGNOSIS — Z96.1: ICD-10-CM

## 2024-12-17 DIAGNOSIS — H01.001: ICD-10-CM

## 2024-12-17 DIAGNOSIS — H01.004: ICD-10-CM

## 2024-12-17 DIAGNOSIS — H40.1123: ICD-10-CM

## 2024-12-17 DIAGNOSIS — H40.1111: ICD-10-CM

## 2024-12-17 PROCEDURE — 92014 COMPRE OPH EXAM EST PT 1/>: CPT | Performed by: OPHTHALMOLOGY

## 2024-12-17 ASSESSMENT — VISUAL ACUITY
OD_BCVA: NLP
OS_BCVA: 20/20

## 2024-12-17 ASSESSMENT — REFRACTION_AUTOREFRACTION
OD_SPHERE: -0.50
OD_AXIS: 073
OD_CYLINDER: -2.00

## 2024-12-17 ASSESSMENT — LID EXAM ASSESSMENTS
OS_BLEPHARITIS: LUL 2+
OD_BLEPHARITIS: RUL 2+

## 2024-12-17 ASSESSMENT — REFRACTION_CURRENTRX
OS_AXIS: 070
OS_VPRISM_DIRECTION: BF
OS_ADD: +3.25
OD_SPHERE: -0.25
OD_ADD: +3.25
OD_AXIS: 076
OS_OVR_VA: 20/
OD_VPRISM_DIRECTION: BF
OS_CYLINDER: -2.00
OD_CYLINDER: -2.00
OS_SPHERE: -0.25
OD_OVR_VA: 20/

## 2024-12-17 ASSESSMENT — KERATOMETRY
OS_K1POWER_DIOPTERS: 44.75
OS_K2POWER_DIOPTERS: 47.00
OD_K2POWER_DIOPTERS: 7.25
OS_AXISANGLE_DEGREES: 125
OD_K1POWER_DIOPTERS: 45.75
METHOD_AUTO_MANUAL: AUTO
OD_AXISANGLE_DEGREES: 157

## 2024-12-17 ASSESSMENT — PACHYMETRY
OD_CT_UM: 477
OS_CT_CORRECTION: 3
OS_CT_UM: 508
OD_CT_CORRECTION: 5

## 2024-12-17 ASSESSMENT — REFRACTION_MANIFEST
OD_SPHERE: +0.25
OD_VA1: 20/20-2
OD_CYLINDER: -2.25
OD_AXIS: 080
OS_SPHERE: UNABLE

## 2024-12-17 ASSESSMENT — TONOMETRY: OD_IOP_MMHG: 12

## 2025-04-08 NOTE — DISCHARGE NOTE PROVIDER - NSDCFUADDINST_GEN_ALL_CORE_FT
06-03    139  |  105  |  25.0<H>  ----------------------------<  126<H>  4.4   |  23.0  |  0.90    Ca    8.7      03 Jun 2019 06:23  Phos  3.8     06-03  Mg     2.1     06-03                            9.9    7.5   )-----------( 318      ( 04 Jun 2019 06:16 )             30.7       C3 Complement, Serum: 118 mg/dL (06.03.19 @ 19:02)  C4 Complement, Serum: 23 mg/dL (06.03.19 @ 19:02)  Sedimentation Rate, Erythrocyte: 52 mm/hr (06.02.19 @ 05:37)
No indicators present

## 2025-04-19 ENCOUNTER — OFFICE (OUTPATIENT)
Dept: URBAN - METROPOLITAN AREA CLINIC 6 | Facility: CLINIC | Age: OVER 89
Setting detail: OPHTHALMOLOGY
End: 2025-04-19
Payer: MEDICARE

## 2025-04-19 DIAGNOSIS — H01.001: ICD-10-CM

## 2025-04-19 DIAGNOSIS — Z96.1: ICD-10-CM

## 2025-04-19 DIAGNOSIS — H40.1123: ICD-10-CM

## 2025-04-19 DIAGNOSIS — H01.004: ICD-10-CM

## 2025-04-19 DIAGNOSIS — H26.492: ICD-10-CM

## 2025-04-19 DIAGNOSIS — H40.1111: ICD-10-CM

## 2025-04-19 PROCEDURE — 92083 EXTENDED VISUAL FIELD XM: CPT | Performed by: OPHTHALMOLOGY

## 2025-04-19 PROCEDURE — 99214 OFFICE O/P EST MOD 30 MIN: CPT | Performed by: OPHTHALMOLOGY

## 2025-04-19 PROCEDURE — 92133 CPTRZD OPH DX IMG PST SGM ON: CPT | Performed by: OPHTHALMOLOGY

## 2025-04-19 ASSESSMENT — KERATOMETRY
OS_K2POWER_DIOPTERS: 47.00
METHOD_AUTO_MANUAL: AUTO
OD_K2POWER_DIOPTERS: 7.25
OS_K1POWER_DIOPTERS: 44.75
OD_K1POWER_DIOPTERS: 45.75
OS_AXISANGLE_DEGREES: 125
OD_AXISANGLE_DEGREES: 157

## 2025-04-19 ASSESSMENT — PACHYMETRY
OD_CT_UM: 477
OS_CT_UM: 508
OD_CT_CORRECTION: 5
OS_CT_CORRECTION: 3

## 2025-04-19 ASSESSMENT — LID EXAM ASSESSMENTS
OD_BLEPHARITIS: RUL 2+ 3+
OS_BLEPHARITIS: LUL 2+ 3+

## 2025-04-19 ASSESSMENT — REFRACTION_CURRENTRX
OS_OVR_VA: 20/
OS_VPRISM_DIRECTION: BF
OS_AXIS: 070
OD_AXIS: 076
OD_OVR_VA: 20/
OS_CYLINDER: -2.00
OD_CYLINDER: -2.00
OS_ADD: +3.25
OD_ADD: +3.25
OD_SPHERE: -0.25
OD_VPRISM_DIRECTION: BF
OS_SPHERE: -0.25

## 2025-04-19 ASSESSMENT — REFRACTION_AUTOREFRACTION
OD_CYLINDER: -2.00
OD_AXIS: 073
OD_SPHERE: -0.50

## 2025-04-19 ASSESSMENT — VISUAL ACUITY
OD_BCVA: NLP
OS_BCVA: 20/30-2

## 2025-04-19 ASSESSMENT — REFRACTION_MANIFEST
OD_SPHERE: +0.25
OD_CYLINDER: -2.25
OD_VA1: 20/20-2
OS_SPHERE: UNABLE
OD_AXIS: 080

## 2025-04-19 ASSESSMENT — CONFRONTATIONAL VISUAL FIELD TEST (CVF)
OS_FINDINGS: FULL
OD_FINDINGS: FULL

## 2025-04-19 ASSESSMENT — TONOMETRY: OD_IOP_MMHG: 14

## 2025-04-30 NOTE — ED CDU PROVIDER INITIAL DAY NOTE - CPE EDP NEURO NORM
"Pt currently NPO for esophagram and US. Previously while ordered oral diet noting very limited meal completions. Pt had a 4 oz yogurt yesterday, pt stating \"I thought that was pretty good\" regarding amount of po intake. Pt states has phlegm or water come up though does not vomit food. Says nausea has improved though poor intake continues.     Emphasized with pt that current intake rate is inadequate to meet nutrition needs and will likely have worsening malnutrition if this were to persist, pt nodding in understanding. Encouraged completion of ensure supplements, unfortunately do not have strawberry flavor available though pt agreeable to try chocolate and vanilla flavors. Pt noted to only be interested in consuming yogurt, jello, nutrition supplements, soup and mac n cheese.     If consumes ~5 of ensure plus high PRO supplements per day would meeting ~100% estimated kcal and PRO needs. Currently ordered ensure plus high PRO TID, will increase if she completes three per day/as indicated.     If oral intake does not improve, will need to consider enteral nutrition. Will provide EN recommendations as indicated.   " Spoke to the pt and per Dr. Mike Fowler and MOM. And to let her know that she can have spotting with the Depo and to follow up with her next Depo for 1/16/23. Pt stated ok and she will try the warm prune juice and the senokot. normal...